# Patient Record
Sex: MALE | Race: WHITE | NOT HISPANIC OR LATINO | Employment: OTHER | URBAN - METROPOLITAN AREA
[De-identification: names, ages, dates, MRNs, and addresses within clinical notes are randomized per-mention and may not be internally consistent; named-entity substitution may affect disease eponyms.]

---

## 2020-05-21 ENCOUNTER — TRANSFERRED RECORDS (OUTPATIENT)
Dept: HEALTH INFORMATION MANAGEMENT | Facility: CLINIC | Age: 55
End: 2020-05-21

## 2020-06-20 ENCOUNTER — TRANSFERRED RECORDS (OUTPATIENT)
Dept: HEALTH INFORMATION MANAGEMENT | Facility: CLINIC | Age: 55
End: 2020-06-20

## 2020-07-07 ENCOUNTER — TRANSFERRED RECORDS (OUTPATIENT)
Dept: HEALTH INFORMATION MANAGEMENT | Facility: CLINIC | Age: 55
End: 2020-07-07

## 2020-08-06 ENCOUNTER — MEDICAL CORRESPONDENCE (OUTPATIENT)
Dept: HEALTH INFORMATION MANAGEMENT | Facility: CLINIC | Age: 55
End: 2020-08-06

## 2020-08-06 ENCOUNTER — TRANSFERRED RECORDS (OUTPATIENT)
Dept: HEALTH INFORMATION MANAGEMENT | Facility: CLINIC | Age: 55
End: 2020-08-06

## 2020-08-31 ENCOUNTER — DOCUMENTATION ONLY (OUTPATIENT)
Dept: TRANSPLANT | Facility: CLINIC | Age: 55
End: 2020-08-31

## 2020-08-31 ENCOUNTER — REFERRAL (OUTPATIENT)
Dept: TRANSPLANT | Facility: CLINIC | Age: 55
End: 2020-08-31

## 2020-08-31 VITALS — HEIGHT: 71 IN | BODY MASS INDEX: 29.12 KG/M2 | WEIGHT: 208 LBS

## 2020-08-31 DIAGNOSIS — I73.9 PVD (PERIPHERAL VASCULAR DISEASE) (H): ICD-10-CM

## 2020-08-31 DIAGNOSIS — I10 ESSENTIAL HYPERTENSION: ICD-10-CM

## 2020-08-31 DIAGNOSIS — N18.6 END STAGE RENAL DISEASE (H): ICD-10-CM

## 2020-08-31 DIAGNOSIS — Z01.818 ENCOUNTER FOR PRE-TRANSPLANT EVALUATION FOR KIDNEY AND PANCREAS TRANSPLANT: ICD-10-CM

## 2020-08-31 DIAGNOSIS — E78.5 HYPERLIPIDEMIA: ICD-10-CM

## 2020-08-31 DIAGNOSIS — E11.9 DIABETES (H): ICD-10-CM

## 2020-08-31 DIAGNOSIS — L08.9 FOOT INFECTION: ICD-10-CM

## 2020-08-31 DIAGNOSIS — G47.33 OBSTRUCTIVE SLEEP APNEA: ICD-10-CM

## 2020-08-31 DIAGNOSIS — J44.9 COPD (CHRONIC OBSTRUCTIVE PULMONARY DISEASE) (H): ICD-10-CM

## 2020-08-31 DIAGNOSIS — N18.6 ESRD (END STAGE RENAL DISEASE) ON DIALYSIS (H): ICD-10-CM

## 2020-08-31 DIAGNOSIS — Z87.891 HISTORY OF TOBACCO USE: ICD-10-CM

## 2020-08-31 DIAGNOSIS — I10 HYPERTENSION: ICD-10-CM

## 2020-08-31 DIAGNOSIS — N18.6 ESRD (END STAGE RENAL DISEASE) (H): Primary | ICD-10-CM

## 2020-08-31 DIAGNOSIS — E11.9 DIABETES MELLITUS, TYPE 2 (H): ICD-10-CM

## 2020-08-31 DIAGNOSIS — Z76.82 ORGAN TRANSPLANT CANDIDATE: ICD-10-CM

## 2020-08-31 DIAGNOSIS — Z99.2 ESRD (END STAGE RENAL DISEASE) ON DIALYSIS (H): ICD-10-CM

## 2020-08-31 SDOH — HEALTH STABILITY: MENTAL HEALTH: HOW OFTEN DO YOU HAVE A DRINK CONTAINING ALCOHOL?: MONTHLY OR LESS

## 2020-08-31 ASSESSMENT — MIFFLIN-ST. JEOR: SCORE: 1805.61

## 2020-08-31 NOTE — TELEPHONE ENCOUNTER
PCP: unknown-patient can't remember   Referring Provider: Dr. Tony Alberto  Referring Diagnosis: ESRD secondary to DM Type 2    Is patient under the age of 65? Y  Is patient diabetic? Y  Is patient on insulin? Y  Was patient offered a pancreas transplant referral? Y    Is patient in a group home/assisted living? [facility] due to R lower leg amputation 2020  Does patient have a guardian? No    Due to COVID, verbal consent received for Care Everywhere Authorization from the patient during this call.  Patient is a poor historian. He stated he moved from [place] this year where he lived for 12 years. He could not name any of his current providers stating he was terrible with names. He informed this writer his previous clinic in [place] was [facility] and also seen in [facility]. Providers names he could not remember.    Referral intake process completed.  Patient is aware that after financial approval is received, medical records will be requested.   Patient confirmed for a callback from transplant coordinator on Sept. 11, 2020. (within 2 weeks)  Tentative evaluation date Sept. 30, 2020. (within 4 weeks)    Confirmed coordinator will discuss evaluation process in more detail at the time of their call.   Patient is aware of the need to arrange age appropriate cancer screening, vaccinations, and dental care.  Reminded patient to complete questionnaire, complete medical records release, and review packet prior to evaluation visit .  Assessed patient for special needs (ie--wheelchair, assistance, guardian, and ):  walker, wheelchair- prn   Patient instructed to call [phone] with questions.

## 2020-08-31 NOTE — LETTER
September 8, 2020      Vincent Leblanc  [address] [address]          Dear Vincent,    Thank you for your interest in the Transplant Center at [facility], [facility] [place] [facility]. We look forward to being a part of your care team and assisting you through the transplant process.    As we discussed, your transplant coordinator is Beth Wan (Pancreas, Kidney).  You may call your coordinator at any time with questions or concerns.  Your first scheduled call will be on September 11, 2020.  If this needs to change, call [phone].    Please complete the following.    1. Fill out and return the enclosed forms    Authorization for Electronic Communication    Authorization to Discuss Protected Health Information    Authorization for Release of Protected Health Information    2. Sign up for:    [employer], access to your electronic medical record (see enclosed pamphlet)    [employer], a transplant education website    You can use these tools to learn more about your transplant, communicate with your care team, and track your medical details      Sincerely,      Solid Organ Transplant  [facility]    cc: Care Team

## 2020-08-31 NOTE — Clinical Note
See smart set. Needs to be seen in person by  surgeon. STD needs to be changed. He has email so information can be sent electronically. Dialysis is T/TH/SA.
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
## 2020-09-15 NOTE — TELEPHONE ENCOUNTER
Called patient to schedule PKE clinic.  Reviewed equipment needed for virtual visits. Patient confirmed his sister has a laptop with camera and would like to schedule for virtual evaluation.  Reviewed schedule of appts for virtual evaluation. Patient is active in [employer]. Informed patient we would send information about appointments via [employer] and Transplant coordinator will call a few days prior to clinic to review education.
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
## 2020-10-06 ENCOUNTER — TELEPHONE (OUTPATIENT)
Dept: TRANSPLANT | Facility: CLINIC | Age: 55
End: 2020-10-06

## 2020-10-06 NOTE — TELEPHONE ENCOUNTER
Called New and got his voice mail. I reminded him his PKE was tomorrow. I asked him to check his My Chart for information on registering for MTP and watch the videos before tomorrow. I told him ideally I would like to review the videos before his evaluation so he has a solid foundation. Also, talked about the need for the docusign that will be needed before any listing. Gave him my new [employer] phone number.

## 2020-10-07 ENCOUNTER — DOCUMENTATION ONLY (OUTPATIENT)
Dept: TRANSPLANT | Facility: CLINIC | Age: 55
End: 2020-10-07

## 2020-10-07 ENCOUNTER — APPOINTMENT (OUTPATIENT)
Dept: TRANSPLANT | Facility: CLINIC | Age: 55
End: 2020-10-07
Attending: INTERNAL MEDICINE
Payer: COMMERCIAL

## 2020-10-07 DIAGNOSIS — Z53.9 ERRONEOUS ENCOUNTER--DISREGARD: Primary | ICD-10-CM

## 2020-10-07 DIAGNOSIS — Z76.82 ORGAN TRANSPLANT CANDIDATE: Primary | ICD-10-CM

## 2020-10-07 DIAGNOSIS — I10 ESSENTIAL HYPERTENSION: ICD-10-CM

## 2020-10-07 DIAGNOSIS — G47.33 OBSTRUCTIVE SLEEP APNEA: ICD-10-CM

## 2020-10-07 DIAGNOSIS — Z87.891 HISTORY OF TOBACCO USE: ICD-10-CM

## 2020-10-07 DIAGNOSIS — E11.9 DIABETES MELLITUS, TYPE 2 (H): ICD-10-CM

## 2020-10-07 DIAGNOSIS — E78.5 HYPERLIPIDEMIA: ICD-10-CM

## 2020-10-07 DIAGNOSIS — Z01.818 ENCOUNTER FOR PRE-TRANSPLANT EVALUATION FOR KIDNEY AND PANCREAS TRANSPLANT: ICD-10-CM

## 2020-10-07 DIAGNOSIS — L08.9 FOOT INFECTION: ICD-10-CM

## 2020-10-07 DIAGNOSIS — N18.6 ESRD (END STAGE RENAL DISEASE) (H): ICD-10-CM

## 2020-10-07 DIAGNOSIS — N18.6 END STAGE RENAL DISEASE (H): ICD-10-CM

## 2020-10-07 DIAGNOSIS — I73.9 PVD (PERIPHERAL VASCULAR DISEASE) (H): ICD-10-CM

## 2020-10-07 DIAGNOSIS — Z76.82 ORGAN TRANSPLANT CANDIDATE: ICD-10-CM

## 2020-10-07 PROCEDURE — 99207 PR NO CHARGE LOS: CPT | Mod: TEL
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
## 2020-10-07 NOTE — PROGRESS NOTES
Called patient today and spoke with him. Pt explained that he did not attend his pre KP evaluation appointments this morning because he had to have eye surgery. Patient apologized and is very interested in rescheduling. Explained I will ask a  to call him and assist with rescheduling his pre KP eval. Pt expressed very good understanding of all and was in good agreement with the plan.     Staff message today to  to reschedule KP eval, cc'annemarie GOLDBERG RN Transplant Coordinator.
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
## 2020-10-07 NOTE — PROGRESS NOTES
"Patient was called and message left. Yanci Catalan on 10/7/2020 at 8:21 AM    Patient was called and message left to call back. Yanci Catalan on 10/7/2020 at 7:56 AM    Patient was called and message left to call back. Yanci Catalan on 10/7/2020 at 7:33 AM    Vincent Leblanc is a 54 year old male who is being evaluated via a billable video visit.      The patient has been notified of following:     \"This video visit will be conducted via a call between you and your physician/provider. We have found that certain health care needs can be provided without the need for an in-person physical exam.  This service lets us provide the care you need with a video conversation.  If a prescription is necessary we can send it directly to your pharmacy.  If lab work is needed we can place an order for that and you can then stop by our lab to have the test done at a later time.    Video visits are billed at different rates depending on your insurance coverage.  Please reach out to your insurance provider with any questions.    If during the course of the call the physician/provider feels a video visit is not appropriate, you will not be charged for this service.\"    Patient has given verbal consent for Video visit? Yes  How would you like to obtain your AVS? MyChart  If you are dropped from the video visit, the video invite should be resent to: Text to cell phone: [phone]  Will anyone else be joining your video visit? No  {If patient encounters technical issues they should call [phone] :669995}      Video-Visit Details    Type of service:  Video Visit    Video Start Time: {video visit start/end time:152948}  Video End Time: {video visit start/end time:152948}    Originating Location (pt. Location): {video visit patient location:027592::\"Home\"}    Distant Location (provider location):  [facility]     Platform used for Video Visit: {Virtual Visit Platforms:660520::\"[place] [employer]\"}    {signature " options:614981}

## 2020-12-09 NOTE — TELEPHONE ENCOUNTER
Called patient to schedule [facility] clinic. Reviewed equipment needed for virtual visits. Patient confirmed his sister has equipment for virtual evaluation and would like to schedule for Wed 1/6/20. Patient aware that is not a pancreas surgeon clinic but would prefer that date and to follow-up re: pancreas transplant in the future. Reviewed that patient will receive a call between 730-8am from [facility] on date of evaluation, and then will have appts with Surgery, Nephrology, SW and Dietician to follow. Informed patient that evaluation clinic will last 3-4hours. Informed patient we would send information about appointments via [employer] week prior to clinic and Transplant coordinator will call to review education.

## 2021-01-04 ENCOUNTER — HEALTH MAINTENANCE LETTER (OUTPATIENT)
Age: 56
End: 2021-01-04

## 2021-01-06 ENCOUNTER — ALLIED HEALTH/NURSE VISIT (OUTPATIENT)
Dept: TRANSPLANT | Facility: CLINIC | Age: 56
End: 2021-01-06
Attending: INTERNAL MEDICINE
Payer: COMMERCIAL

## 2021-01-06 ENCOUNTER — DOCUMENTATION ONLY (OUTPATIENT)
Dept: TRANSPLANT | Facility: CLINIC | Age: 56
End: 2021-01-06

## 2021-01-06 VITALS — BODY MASS INDEX: 27.3 KG/M2 | WEIGHT: 195 LBS | HEIGHT: 71 IN

## 2021-01-06 DIAGNOSIS — N18.6 TYPE 2 DIABETES MELLITUS WITH CHRONIC KIDNEY DISEASE ON CHRONIC DIALYSIS, WITH LONG-TERM CURRENT USE OF INSULIN (H): Primary | ICD-10-CM

## 2021-01-06 DIAGNOSIS — Z01.818 ENCOUNTER FOR PRE-TRANSPLANT EVALUATION FOR KIDNEY AND PANCREAS TRANSPLANT: ICD-10-CM

## 2021-01-06 DIAGNOSIS — Z76.82 ORGAN TRANSPLANT CANDIDATE: Primary | ICD-10-CM

## 2021-01-06 DIAGNOSIS — K52.9 CHRONIC DIARRHEA: ICD-10-CM

## 2021-01-06 DIAGNOSIS — Z76.82 ORGAN TRANSPLANT CANDIDATE: ICD-10-CM

## 2021-01-06 DIAGNOSIS — I73.9 PAD (PERIPHERAL ARTERY DISEASE) (H): ICD-10-CM

## 2021-01-06 DIAGNOSIS — Z79.4 TYPE 2 DIABETES MELLITUS WITH CHRONIC KIDNEY DISEASE ON CHRONIC DIALYSIS, WITH LONG-TERM CURRENT USE OF INSULIN (H): Primary | ICD-10-CM

## 2021-01-06 DIAGNOSIS — Z99.2 TYPE 2 DIABETES MELLITUS WITH CHRONIC KIDNEY DISEASE ON CHRONIC DIALYSIS, WITH LONG-TERM CURRENT USE OF INSULIN (H): Primary | ICD-10-CM

## 2021-01-06 DIAGNOSIS — Z87.891 FORMER TOBACCO USE: ICD-10-CM

## 2021-01-06 DIAGNOSIS — N18.6 ESRD (END STAGE RENAL DISEASE) (H): ICD-10-CM

## 2021-01-06 DIAGNOSIS — E11.22 TYPE 2 DIABETES MELLITUS WITH CHRONIC KIDNEY DISEASE ON CHRONIC DIALYSIS, WITH LONG-TERM CURRENT USE OF INSULIN (H): Primary | ICD-10-CM

## 2021-01-06 PROCEDURE — 99245 OFF/OP CONSLTJ NEW/EST HI 55: CPT | Mod: 95

## 2021-01-06 PROCEDURE — 99204 OFFICE O/P NEW MOD 45 MIN: CPT | Mod: 95 | Performed by: SURGERY

## 2021-01-06 RX ORDER — DORZOLAMIDE HCL 20 MG/ML
SOLUTION/ DROPS OPHTHALMIC
COMMUNITY
Start: 2020-12-14

## 2021-01-06 RX ORDER — BRIMONIDINE TARTRATE, TIMOLOL MALEATE 2; 5 MG/ML; MG/ML
1 SOLUTION/ DROPS OPHTHALMIC
COMMUNITY
Start: 2020-10-30

## 2021-01-06 RX ORDER — TAMSULOSIN HYDROCHLORIDE 0.4 MG/1
0.4 CAPSULE ORAL
COMMUNITY
Start: 2020-05-26

## 2021-01-06 RX ORDER — DORZOLAMIDE HCL 20 MG/ML
1 SOLUTION/ DROPS OPHTHALMIC
COMMUNITY

## 2021-01-06 RX ORDER — CARVEDILOL 25 MG/1
25 TABLET ORAL
COMMUNITY
Start: 2020-05-25

## 2021-01-06 RX ORDER — FUROSEMIDE 80 MG
80 TABLET ORAL
COMMUNITY
Start: 2020-05-26

## 2021-01-06 RX ORDER — HYDRALAZINE HYDROCHLORIDE 25 MG/1
25 TABLET, FILM COATED ORAL
COMMUNITY
Start: 2020-05-25

## 2021-01-06 RX ORDER — ASPIRIN 81 MG/1
TABLET, CHEWABLE ORAL
COMMUNITY
Start: 2020-12-20

## 2021-01-06 RX ORDER — CLOPIDOGREL BISULFATE 75 MG/1
TABLET ORAL
COMMUNITY
Start: 2020-12-20

## 2021-01-06 RX ORDER — AMLODIPINE BESYLATE 5 MG/1
5 TABLET ORAL
COMMUNITY
Start: 2020-05-25

## 2021-01-06 RX ORDER — SEVELAMER HYDROCHLORIDE 800 MG/1
800 TABLET, FILM COATED ORAL
COMMUNITY
Start: 2020-08-06

## 2021-01-06 RX ORDER — VIT B COMP NO.3/FOLIC/C/BIOTIN 1 MG-60 MG
1 TABLET ORAL
COMMUNITY
Start: 2020-05-26

## 2021-01-06 RX ORDER — OFLOXACIN 3 MG/ML
SOLUTION/ DROPS OPHTHALMIC
COMMUNITY
Start: 2020-11-30

## 2021-01-06 RX ORDER — LOPERAMIDE HCL 2 MG
6 CAPSULE ORAL
COMMUNITY
Start: 2020-06-16

## 2021-01-06 RX ORDER — PREDNISOLONE ACETATE 10 MG/ML
1 SUSPENSION/ DROPS OPHTHALMIC
COMMUNITY

## 2021-01-06 RX ORDER — ATORVASTATIN CALCIUM 40 MG/1
TABLET, FILM COATED ORAL
COMMUNITY
Start: 2020-12-20

## 2021-01-06 RX ORDER — LATANOPROST 50 UG/ML
SOLUTION/ DROPS OPHTHALMIC
COMMUNITY
Start: 2020-12-14

## 2021-01-06 RX ORDER — NEOMYCIN/POLYMYXIN B/DEXAMETHA 3.5-10K-.1
OINTMENT (GRAM) OPHTHALMIC (EYE)
COMMUNITY

## 2021-01-06 ASSESSMENT — PAIN SCALES - GENERAL: PAINLEVEL: NO PAIN (0)

## 2021-01-06 ASSESSMENT — MIFFLIN-ST. JEOR: SCORE: 1741.64

## 2021-01-06 NOTE — LETTER
2021         RE: Vincent Leblanc  [facility]  [address] [address]        Dear Colleague,    Thank you for referring your patient, Vincent Leblanc, to the [facility]. Please see a copy of my visit note below.    Video visit ([employer]) Transplant Surgery Consult Note    Medical record number: [id number]  YOB: 1965,   Consult requested by Dr. Alberto for evaluation of kidney and pancreas transplant candidacy.    Assessment and Recommendations: Mr. Leblanc is a good candidate for transplantation and has a good understanding of the risks and benefits of this approach to management of renal failure and diabetes. The following issues should be addressed prior to transplant:     54 yo with type 2 for 15-20 years  Takes about 70 units/d between Lantus and Novolog  A1c 7.2  No hypo unawareness  Dialysis since 2020  BKA in 2020  No blood transfusion  Lap alejandro 8 years ago  Reasonable candidate for KP    Walks 1-3 miles everyday    Risks of the surgical procedure including but not limited to the rare risk of mortality discussed in detail. Patient verbalized good understanding and had several pertinent questions which were answered satisfactorily.             Mr. Leblanc has End stage renal failure due to diabetes mellitus type 2 whose condition is not expected to resolve, is expected to progress, and is expected to continue to develop related comorbid conditions.  Cardiology consult for cardiac risk stratification to be ordered: Yes  CT abdomen and pelvis without contrast to be ordered for assessment of vascular targets: Yes  Transplant listing labs ordered to include HLA, ABOx2, Cr, etc.  Dietician consult ordered: Yes  Social work consult ordered: Yes  Imaging reports reviewed:  yes  Radiology images reviewed:no      The majority of our visit was spent in counselling, discussing the medical and surgical risks of living or  donor  kidney and pancreas transplantation, either in a simultaneous or sequential fashion. I contrasted approximate wait time for SPK vs living vs  donor kidneys from normal (0-85%) or higher (%) kidney donor profile index (KDPI) donors and their associated outcomes. I would not recommend this individual to consider kidneys from high KDPI donors. The reason for this decision is best summarized as: multi-organ transplant candidate.  Access to transplant will be impacted by living donor availability and overall candidacy for SPK, as well as the influence of blood type and degree of sensitization. We discussed advantages of preemptive transplant as well as living donor kidney transplant, and graft and patient survival outcomes associated with these options. Potential surgical complications of kidney and pancreas transplantation include bleeding, clotting, infection, wound complications, anastomotic failure and other issues such as cardiac complications, pneumonia, deep venous thrombosis, pulmonary embolism, post transplant diabetes and death. We discussed the need for protocol biopsy of the kidney and the possible need for a ureteral stent (and subsequent removal). We discussed benefits and risks associated with different approaches to exocrine drainage of pancreatic secretions. We also discussed differences in the average length of stay, recovery process, and posttransplant lab and monitoring protocol. We discussed the risk of graft rejection and recurrent diabetic nephropathy in the setting of poor glycemic control. I emphasized the need for strict immunosuppression adherence and the potential for complications of immunosuppression such as skin cancer or lymphoma, as well as a very low but not zero risk of donor-derived disease transmission risks (infection, cancer). Mr. Leblanc asked good questions and the patient's candidacy will be reviewed at our Multidisciplinary Selection Committee. Thank you for the  opportunity to participate in Mr. Leblanc's care.    Total time: 45 minutes  Counselling time: 40 minutes    .  ---------------------------------------------------------------------------------------------------    HPI: Mr. Leblanc has End stage renal failure due to diabetes mellitus type 2. The patient has had diabetes for 20 years. Management is by Lantus per diabetic educator  Novolog per diabetic educator. The patient usually checks his blood sugar 3 times/day.    Complications of diabetes include:    Retinopathy:  No  Neuropathy: Yes   Gastroparesis:  No    The patient is on dialysis.    Has potential kidney donors:  No.  Interested in participation in paired exchange if a donor is willing: No    The patient has the following pertinent history:       No    Yes  Dialysis:    []      [] via:       Blood Transfusion                  []      []  Number of units:   Most recently:  Pregnancy:    []      [] Number:       Previous Transplant:  []      [] Details:    Cancer    []      [] Comment:   Kidney stones   []      [] Comment:      Recurrent infections  []      []  Type:                  Bladder dysfunction  []      [] Cause:    Claudication   []      [] Distance:    Previous Amputation  []      [] Cause:     Chronic anticoagulation  []      [] Indication:  Protestant  []      []     Past Medical History:   Diagnosis Date     COPD (chronic obstructive pulmonary disease) (H)      Diabetes (H) 2000    Type 2     Diarrhea      ESRD (end stage renal disease) on dialysis (H)      Fecal incontinence      Hyperlipidemia      Hypertension      Past Surgical History:   Procedure Laterality Date     AMPUTATION Right 2020    [facility] leg[facility]     BACK SURGERY  1991    [place]     CHOLECYSTECTOMY  2014    [place]-name?     ENT SURGERY  1971    T and A     VASCULAR SURGERY  2020    dialysis access- upper chest     No family history on file.  Social History     Socioeconomic History     Marital  status:      Spouse name: Not on file     Number of children: Not on file     Years of education: Not on file     Highest education level: Not on file   Occupational History     Not on file   Social Needs     Financial resource strain: Not on file     Food insecurity     Worry: Not on file     Inability: Not on file     Transportation needs     Medical: Not on file     Non-medical: Not on file   Tobacco Use     Smoking status: Former Smoker     Types: Cigarettes     Quit date:      Years since quittin.0     Smokeless tobacco: Never Used   Substance and Sexual Activity     Alcohol use: Yes     Frequency: Monthly or less     Drug use: Never     Sexual activity: Not on file   Lifestyle     Physical activity     Days per week: Not on file     Minutes per session: Not on file     Stress: Not on file   Relationships     Social connections     Talks on phone: Not on file     Gets together: Not on file     Attends Religion service: Not on file     Active member of club or organization: Not on file     Attends meetings of clubs or organizations: Not on file     Relationship status: Not on file     Intimate partner violence     Fear of current or ex partner: Not on file     Emotionally abused: Not on file     Physically abused: Not on file     Forced sexual activity: Not on file   Other Topics Concern     Parent/sibling w/ CABG, MI or angioplasty before 65F 55M? Not Asked   Social History Narrative     Not on file       ROS:   CONSTITUTIONAL:  No fevers or chills  EYES: negative for icterus  ENT:  negative for hearing loss, tinnitus and sore throat  RESPIRATORY:  negative for cough, sputum, dyspnea  CARDIOVASCULAR:  negative for chest pain Fatigue  GASTROINTESTINAL:  negative for nausea, vomiting, diarrhea or constipation  GENITOURINARY:  negative for incontinence, dysuria, bladder emptying problems  HEME:  No easy bruising  INTEGUMENT:  negative for rash and pruritus  NEURO:  Negative for headache, seizure  disorder    Allergies:   Allergies   Allergen Reactions     Food Anaphylaxis     Allergy to peaches     Lactose Unknown     Added from facility Allergy list        Medications:  Prescription Medications as of 2021       Rx Number Disp Refills Start End Last Dispensed Date Next Fill Date Owning Pharmacy    amLODIPine (NORVASC) 5 MG tablet    2020        Sig: Take 5 mg by mouth    Class: Historical    Route: Oral    aspirin (ASA) 81 MG chewable tablet    2020        Class: Historical    atorvastatin (LIPITOR) 40 MG tablet    2020        Class: Historical    brimonidine-timolol (COMBIGAN) 0.2-0.5 % ophthalmic solution    10/30/2020        Si drop    Class: Historical    carvedilol (COREG) 25 MG tablet    2020        Sig: Take 25 mg by mouth    Class: Historical    Route: Oral    cholecalciferol 25 MCG (1000 UT) TABS    2020        Sig: Take 25 mcg by mouth    Class: Historical    Route: Oral    clopidogrel (PLAVIX) 75 MG tablet    2020        Class: Historical    dorzolamide (TRUSOPT) 2 % ophthalmic solution            Si drop    Class: Historical    dorzolamide (TRUSOPT) 2 % ophthalmic solution    2020        Class: Historical    furosemide (LASIX) 80 MG tablet    2020        Sig: Take 80 mg by mouth    Class: Historical    Route: Oral    hydrALAZINE (APRESOLINE) 25 MG tablet    2020        Sig: Take 25 mg by mouth    Class: Historical    Route: Oral    insulin aspart (NOVOLOG PEN) 100 UNIT/ML pen    2020        Sig: Use carb ratio of 1:8 (breakfast), 1:10 (lunch, supper and snacks) and SF 30 - TDD 60 units    Class: Historical    insulin glargine (LANTUS PEN) 100 UNIT/ML pen    2020        Sig: Inject 24 Units Subcutaneous    Class: Historical    Notes to Pharmacy: If Lantus is not covered by insurance, may substitute Basaglar at same dose and frequency.      Route: Subcutaneous    insulin pen needle (32G X 4 MM) 32G X 4 MM miscellaneous     2020        Class: Historical    Route: Does not apply    iron sucrose (VENOFER) 20 MG/ML injection    2020        Si mg by Intravenous Push route    Class: Historical    Route: Intravenous Push    latanoprost (XALATAN) 0.005 % ophthalmic solution    2020        Class: Historical    loperamide (IMODIUM) 2 MG capsule    2020        Sig: Take 6 mg by mouth    Class: Historical    Route: Oral    multivitamin RENAL (RENAVITE RX/NEPHROVITE) 1 MG tablet    2020        Sig: Take 1 tablet by mouth    Class: Historical    Route: Oral    neomycin-polymixin-dexamethasone (MAXITROL) ophthalmic ointment            Sig: Place  into the left eye at bedtime.    Class: Historical    ofloxacin (OCUFLOX) 0.3 % ophthalmic solution    2020        Class: Historical    prednisoLONE acetate (PRED FORTE) 1 % ophthalmic suspension            Si drop    Class: Historical    sevelamer HCl (RENAGEL) 800 MG tablet    2020        Sig: Take 800 mg by mouth    Class: Historical    Route: Oral    tamsulosin (FLOMAX) 0.4 MG capsule    2020        Sig: Take 0.4 mg by mouth    Class: Historical    Route: Oral          Exam:      Appearance: in no apparent distress.   Skin: normal  Head and Neck: Normal, no rashes or jaundice  Respiratory: easy respirations, no audible wheezing.  Cardiovascular: RRR  Abdomen: obese, Surgical scars consistent with history   Rest of physical deferred    Diagnostics:   No results found for this or any previous visit (from the past 672 hour(s)).  No results found for: CPRA      Again, thank you for allowing me to participate in the care of your patient.        Sincerely,        LISA

## 2021-01-06 NOTE — PROGRESS NOTES
"Psychosocial Assessment  Patient Name/ Age: Vincent Leblanc 55 year old   Medical Record #: [id number]  Duration of Interview:     40  min  Process:   Phone Interview                (counseling < 50%)   Present at Appointment: New and his sister Sadia        :PATRICIA Garcia, [facility] Date:  January 6, 2021        Type of transplant: Kidney/Pancreas    Donor type:   New indicated he does not know of any potential donors at this time.   Cadaver   Prior Transplants:    No Status of Transplant:       Current Living Situation    Location:   [facility] FACILITY  [address] [place], [place] [id number]  With Whom: Brother Juan       Family/ Social Support:    New has two adult children: Radha (27) lives in [place], [place] and Darron (24) [place], [place].  He has one grandson.  Sadia and three additional sisters live in [place], [place].   Available, helpful   Committed relationship:  New indicated he still may be , unsure.  Does not have any contact with his wife.   Single   Other supports:   Friends Available, helpful       Activities/ Functional Ability    Current level:  New wears corrective lenses and has right BKA.   Ambulatory with a cane(sometimes), visually impaired and independent with ADL's     Transportation Drives self       Vocational/Employment/Financial     Employment   Disabled   Job Description      Income  New indicated he became eligible for SSI in April 2020.   SSI   Insurance  [employer] [place]    At this time, patient can afford medication costs:  Yes  [place]       Medical Status    Current mode of treatment for ESRD Dialysis   Complications - Diabetes controlled with insulin. Neuropathy and Glucose Unawareness (1x)       Behavioral    Tobacco Use No Chemical Dependency No   New indicated he quit smoking seven years ago.        Psychiatric Impairment No  See under \"notable items\".    Reading ability Good  Education Level: High School Recent Legal History No      Coping Style/Strategies: New indicated " when under stress he will go for a walk.     Ability to Adhere to Complex Medical Regime: Yes     Adherence History:  New indicated NOW he does follow his physician's recommendations.  He indicated understanding of the importance of being compliant with physician's recommendations.        Education  _X_ Medicare  _X_ Rehabilitation  _X_ Donor issues  _X_ Community resources  _X_ Post discharge housing  _X_ Financial resources  _X_ Medical insurance options  _X_ Psych adjustment  _X_ Family adjustment  _X_ Health Care Directive - Yes, Will Provide Psychosocial Risks of Transplant Reviewed and Discussed:  _X_ Increased stress related to emotional,            family, social, employment or financial           situation  _X_ Affect on work and/or disability benefits  _X_ Affect on future life insurance  _X_ Transplant outcome expectations may           not be met  _X_ Mental Health Risks: anxiety,           depression, PTSD, guilt, grief and           chronic fatigue     Notable Items:   New denied any mental health issues during this assessmet.  During his hospitalization in May 2020 he did have suicidal idealization.  He was seen by psychiatry and diagnosis with Adjustment Disorder with Anxious Distress.  No medications were recommended.  New made statements prior to his BKA and just had started dialysis.  At the time New did express hope for the future and his connection with his children and family were indicated as the reason he could not hurt himself.      Final Evaluation/Assessment   Patient seemed to process information well. Appeared well informed, motivated and able to follow post transplant requirements. Behavior was appropriate during interview. Has adequate income and insurance coverage. Adequate social support. No major contraindications noted for transplant.  At this time patient appears to understand the risks and benefits of transplant.      Recommendation  Acceptable - after completing six month  compliance contract to ensure he continues to follow physician's recommendations.   Selection Criteria Met:  Plan for support Yes   Chemical Dependence Yes   Smoking Yes   Mental Health Yes   Adequate Finances Yes    Signature: PATRICIA Garcia, [facility]   Title: Clinical
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
## 2021-01-06 NOTE — PROGRESS NOTES
[facility] Transplant  Outpatient MNT: Kidney Pancreas Transplant Evaluation    Current BMI: 28 (HT 71 in,  lbs/91 kg)- weight with prosthetic 12/14 CE  BMI is within recommendation of <35 for KP transplant     Time Spent: 15 minutes  Visit Type: Initial   Referring Physician: Ajith     History of previous txp: none   Frequency of BG checks: 3x/day, a little higher/in 200's lately (pt attributes to eating more)  Dialysis: yes    Dialysis Info: HD TTS 1130 am 4/2020  Protein supplement: is offered one, but pt doesn't take d/t diarrhea    Nutrition Assessment  Pt's sister in law and brother cook for pt. Following dialysis diet. Has had diarrhea x 14-15 years. No real follow up from doctors per pt. Happens with any foods/all the time. ?oil in stools     R BKA 5/2020. Weights are with prosthetic     Appetite: very good    Vitamins, Supplements, Pertinent Meds: B complex, phos binder (overall consisent--is on table)  Herbal Medicines/Supplements: none    Weight hx: overall stable, slight decrease     Edema: none     Diet Recall  Breakfast HD days- toast or bagel with butter; non HD days- eggs and toast   Lunch Soup (canned LS or dilutes) and sandwich (liverwurst, ham or salami)   Dinner Pizza burger; roast beef; steak + potatoes/pasta    Snacks None    Beverages Juice (cranberry, 16-24 oz/day), coffee   Alcohol None    Dining out 1x/week     Physical Activity  Walking outside, 3-4x/week for 60+ minutes   Stretching      Labs  Per pt, K/Phos wnl     Nutrition Diagnosis  No nutrition diagnosis identified at this time.     Nutrition Intervention  Nutrition education provided:  Discussed sodium intake (low sodium foods and drinks, seasoning food without salt and tips for low sodium diet). Reviewed considering low sodium deli meat. Discussed protein needs for dialysis in addition to wnl K/Phos levels per his report.    Pt reports possible oil noted in stool. No pancreatic fecal elastase on file.  Consider testing for this for EPI.     Reviewed post txp diet guidelines in brief (will review in further detail post txp):  (1) Review of proper food safety measures d/t immunosuppressant therapy post-op and increased risk for food-borne illness    (2) Avoid the following post txp d/t risk for rejection, unknown effects on the organs, and/or potential interactions with immunosuppressants:  - Herbal, Chinese, holistic, chiropractic, natural, alternative medicines and supplements  - Detoxes and cleanses  - Weight loss pills  - Protein powders or other products with extracts or herbs (ie green tea extract)    (3) Med regimen and possible side effects    Patient Understanding: Pt verbalized understanding of education provided.  Expected Engagement: Good  Follow-Up Plans: PRN     Nutrition Goals  No nutrition goals identified at this time     Maeve Swain RD, LD, CCTD    Pt evaluated via billable telephone visit: 15 min

## 2021-01-06 NOTE — PROGRESS NOTES
Video visit ([facility]) Transplant Surgery Consult Note    Medical record number: [id number]  YOB: 1965,   Consult requested by Dr. Alberto for evaluation of kidney and pancreas transplant candidacy.    Assessment and Recommendations: Mr. Leblanc is a good candidate for transplantation and has a good understanding of the risks and benefits of this approach to management of renal failure and diabetes. The following issues should be addressed prior to transplant:     56 yo with type 2 for 15-20 years  Takes about 70 units/d between Lantus and Novolog  A1c 7.2  No hypo unawareness  Dialysis since 2020  BKA in 2020  No blood transfusion  Lap alejandro 8 years ago  Reasonable candidate for KP    Walks 1-3 miles everyday    Risks of the surgical procedure including but not limited to the rare risk of mortality discussed in detail. Patient verbalized good understanding and had several pertinent questions which were answered satisfactorily.             Mr. Leblanc has End stage renal failure due to diabetes mellitus type 2 whose condition is not expected to resolve, is expected to progress, and is expected to continue to develop related comorbid conditions.  Cardiology consult for cardiac risk stratification to be ordered: Yes  CT abdomen and pelvis without contrast to be ordered for assessment of vascular targets: Yes  Transplant listing labs ordered to include HLA, ABOx2, Cr, etc.  Dietician consult ordered: Yes  Social work consult ordered: Yes  Imaging reports reviewed:  yes  Radiology images reviewed:no      The majority of our visit was spent in counselling, discussing the medical and surgical risks of living or  donor kidney and pancreas transplantation, either in a simultaneous or sequential fashion. I contrasted approximate wait time for SPK vs living vs  donor kidneys from normal (0-85%) or higher (%) kidney donor profile index (KDPI) donors and their associated outcomes.  I would not recommend this individual to consider kidneys from high KDPI donors. The reason for this decision is best summarized as: multi-organ transplant candidate.  Access to transplant will be impacted by living donor availability and overall candidacy for SPK, as well as the influence of blood type and degree of sensitization. We discussed advantages of preemptive transplant as well as living donor kidney transplant, and graft and patient survival outcomes associated with these options. Potential surgical complications of kidney and pancreas transplantation include bleeding, clotting, infection, wound complications, anastomotic failure and other issues such as cardiac complications, pneumonia, deep venous thrombosis, pulmonary embolism, post transplant diabetes and death. We discussed the need for protocol biopsy of the kidney and the possible need for a ureteral stent (and subsequent removal). We discussed benefits and risks associated with different approaches to exocrine drainage of pancreatic secretions. We also discussed differences in the average length of stay, recovery process, and posttransplant lab and monitoring protocol. We discussed the risk of graft rejection and recurrent diabetic nephropathy in the setting of poor glycemic control. I emphasized the need for strict immunosuppression adherence and the potential for complications of immunosuppression such as skin cancer or lymphoma, as well as a very low but not zero risk of donor-derived disease transmission risks (infection, cancer). Mr. Leblanc asked good questions and the patient's candidacy will be reviewed at our Multidisciplinary Selection Committee. Thank you for the opportunity to participate in Mr. Leblanc's care.    Total time: 45 minutes  Counselling time: 40 minutes    .  ---------------------------------------------------------------------------------------------------    HPI: Mr. Leblanc has End stage renal failure due to diabetes  mellitus type 2. The patient has had diabetes for 20 years. Management is by Lantus per diabetic educator  Novolog per diabetic educator. The patient usually checks his blood sugar 3 times/day.    Complications of diabetes include:    Retinopathy:  No  Neuropathy: Yes   Gastroparesis:  No    The patient is on dialysis.    Has potential kidney donors:  No.  Interested in participation in paired exchange if a donor is willing: No    The patient has the following pertinent history:       No    Yes  Dialysis:    []      [] via:       Blood Transfusion                  []      []  Number of units:   Most recently:  Pregnancy:    []      [] Number:       Previous Transplant:  []      [] Details:    Cancer    []      [] Comment:   Kidney stones   []      [] Comment:      Recurrent infections  []      []  Type:                  Bladder dysfunction  []      [] Cause:    Claudication   []      [] Distance:    Previous Amputation  []      [] Cause:     Chronic anticoagulation  []      [] Indication:  Sabianist  []      []     Past Medical History:   Diagnosis Date     COPD (chronic obstructive pulmonary disease) (H)      Diabetes (H) 2000    Type 2     Diarrhea      ESRD (end stage renal disease) on dialysis (H)      Fecal incontinence      Hyperlipidemia      Hypertension      Past Surgical History:   Procedure Laterality Date     AMPUTATION Right 2020    lower leg, [facility]     BACK SURGERY  1991    [place]     CHOLECYSTECTOMY  2014    [place]-name?     ENT SURGERY  1971    [facility]     VASCULAR SURGERY  2020    dialysis access- upper chest     No family history on file.  Social History     Socioeconomic History     Marital status:      Spouse name: Not on file     Number of children: Not on file     Years of education: Not on file     Highest education level: Not on file   Occupational History     Not on file   Social Needs     Financial resource strain: Not on file     Food insecurity      Worry: Not on file     Inability: Not on file     Transportation needs     Medical: Not on file     Non-medical: Not on file   Tobacco Use     Smoking status: Former Smoker     Types: Cigarettes     Quit date:      Years since quittin.0     Smokeless tobacco: Never Used   Substance and Sexual Activity     Alcohol use: Yes     Frequency: Monthly or less     Drug use: Never     Sexual activity: Not on file   Lifestyle     Physical activity     Days per week: Not on file     Minutes per session: Not on file     Stress: Not on file   Relationships     Social connections     Talks on phone: Not on file     Gets together: Not on file     Attends Jainism service: Not on file     Active member of club or organization: Not on file     Attends meetings of clubs or organizations: Not on file     Relationship status: Not on file     Intimate partner violence     Fear of current or ex partner: Not on file     Emotionally abused: Not on file     Physically abused: Not on file     Forced sexual activity: Not on file   Other Topics Concern     Parent/sibling w/ CABG, MI or angioplasty before 65F 55M? Not Asked   Social History Narrative     Not on file       ROS:   CONSTITUTIONAL:  No fevers or chills  EYES: negative for icterus  ENT:  negative for hearing loss, tinnitus and sore throat  RESPIRATORY:  negative for cough, sputum, dyspnea  CARDIOVASCULAR:  negative for chest pain Fatigue  GASTROINTESTINAL:  negative for nausea, vomiting, diarrhea or constipation  GENITOURINARY:  negative for incontinence, dysuria, bladder emptying problems  HEME:  No easy bruising  INTEGUMENT:  negative for rash and pruritus  NEURO:  Negative for headache, seizure disorder    Allergies:   Allergies   Allergen Reactions     Food Anaphylaxis     Allergy to peaches     Lactose Unknown     Added from facility Allergy list        Medications:  Prescription Medications as of 2021       Rx Number Disp Refills Start End Last Dispensed Date Next  Fill Date Owning Pharmacy    amLODIPine (NORVASC) 5 MG tablet    2020        Sig: Take 5 mg by mouth    Class: Historical    Route: Oral    aspirin (ASA) 81 MG chewable tablet    2020        Class: Historical    atorvastatin (LIPITOR) 40 MG tablet    2020        Class: Historical    brimonidine-timolol (COMBIGAN) 0.2-0.5 % ophthalmic solution    10/30/2020        Si drop    Class: Historical    carvedilol (COREG) 25 MG tablet    2020        Sig: Take 25 mg by mouth    Class: Historical    Route: Oral    cholecalciferol 25 MCG (1000 UT) TABS    2020        Sig: Take 25 mcg by mouth    Class: Historical    Route: Oral    clopidogrel (PLAVIX) 75 MG tablet    2020        Class: Historical    dorzolamide (TRUSOPT) 2 % ophthalmic solution            Si drop    Class: Historical    dorzolamide (TRUSOPT) 2 % ophthalmic solution    2020        Class: Historical    furosemide (LASIX) 80 MG tablet    2020        Sig: Take 80 mg by mouth    Class: Historical    Route: Oral    hydrALAZINE (APRESOLINE) 25 MG tablet    2020        Sig: Take 25 mg by mouth    Class: Historical    Route: Oral    insulin aspart (NOVOLOG PEN) 100 UNIT/ML pen    2020        Sig: Use carb ratio of 1:8 (breakfast), 1:10 (lunch, supper and snacks) and SF 30 - TDD 60 units    Class: Historical    insulin glargine (LANTUS PEN) 100 UNIT/ML pen    2020        Sig: Inject 24 Units Subcutaneous    Class: Historical    Notes to Pharmacy: If Lantus is not covered by insurance, may substitute Basaglar at same dose and frequency.      Route: Subcutaneous    insulin pen needle (32G X 4 MM) 32G X 4 MM miscellaneous    2020        Class: Historical    Route: Does not apply    iron sucrose (VENOFER) 20 MG/ML injection    2020        Si mg by Intravenous Push route    Class: Historical    Route: Intravenous Push    latanoprost (XALATAN) 0.005 % ophthalmic solution    2020         Class: Historical    loperamide (IMODIUM) 2 MG capsule    2020        Sig: Take 6 mg by mouth    Class: Historical    Route: Oral    multivitamin RENAL (RENAVITE RX/NEPHROVITE) 1 MG tablet    2020        Sig: Take 1 tablet by mouth    Class: Historical    Route: Oral    neomycin-polymixin-dexamethasone (MAXITROL) ophthalmic ointment            Sig: Place  into the left eye at bedtime.    Class: Historical    ofloxacin (OCUFLOX) 0.3 % ophthalmic solution    2020        Class: Historical    prednisoLONE acetate (PRED FORTE) 1 % ophthalmic suspension            Si drop    Class: Historical    sevelamer HCl (RENAGEL) 800 MG tablet    2020        Sig: Take 800 mg by mouth    Class: Historical    Route: Oral    tamsulosin (FLOMAX) 0.4 MG capsule    2020        Sig: Take 0.4 mg by mouth    Class: Historical    Route: Oral          Exam:      Appearance: in no apparent distress.   Skin: normal  Head and Neck: Normal, no rashes or jaundice  Respiratory: easy respirations, no audible wheezing.  Cardiovascular: RRR  Abdomen: obese, Surgical scars consistent with history   Rest of physical deferred    Diagnostics:   No results found for this or any previous visit (from the past 672 hour(s)).  No results found for: CPRA

## 2021-01-06 NOTE — PROGRESS NOTES
Kidney, Pancreas Transplant Referral - 8/11/2020  Vincent Leblanc attended the pre-transplant evaluation VIRTUALLY 1/6/2021.      Vincent Leblanc was provided Beth Wan's business card and instructed to call with additional questions.      Summary    Team s concerns/comments: Type II DM on dialysis May 2020;  candidate     Candidacy category: Yellow     Action/Plan:  1) Need Cardiac risk assessment  2) s/p right tibial angioplasty and right BKA May 2020. Will need non-contrast abd/pelv CT and aortoiliac US.   3) previously smoked 1/2 PPD x 15 years. Will need PFTs and possible non contrast chest CT   4) Chronic Diarrhea  local GI  plans EGD and colonoscopy.  Patient to follow up with local GI and complete work up.   5) Depression with BKA and dialysis ; social work to review at committee  6)  Dr. Canseco Reasonable candidate for     Expected Selection Meeting Discussion:  Yellow discuss at  1/13/2021

## 2021-01-13 ENCOUNTER — COMMITTEE REVIEW (OUTPATIENT)
Dept: TRANSPLANT | Facility: CLINIC | Age: 56
End: 2021-01-13

## 2021-01-13 DIAGNOSIS — Z76.82 AWAITING ORGAN TRANSPLANT: Primary | ICD-10-CM

## 2021-01-13 NOTE — Clinical Note
Needs in person visit with  surgeon, cardiology, CT a/p, iliacs. Cxr, Ekg, echo and labs. He was hoping to have done at [facility] but he needs in person with surgeon.

## 2021-01-13 NOTE — COMMITTEE REVIEW
Abdominal Committee Review Note     Evaluation Date: 1/6/2021  Committee Review Date: 1/13/2021    Organ being evaluated for: Kidney/Pancreas    Transplant Phase: Evaluation  Transplant Status: Active    Transplant Coordinator: Beth Wan  Transplant Surgeon:   Marin Canseco    Referring Physician: Tony Alberto    Primary Diagnosis: Diabetes Mellitus - Type II (Pancreas)  Secondary Diagnosis:     Committee Review Members:  Nephrology Uri Wright, APRN CNP   Nutrition Maeve Swain, MERY   Pharmacist Clinician- Clinical Pharmacy Specialist Cesar Escobar, [facility]    - Clinical Velia Rosa, [facility], Beata Moser, [facility]   Transplant Felicita Forrester PA-C, Soledad Johnson, ELISA, Juan Winters MD, Vanessa Mcnair RN, Ulices Olguin MD, Beth Wan, RN       Transplant Eligibility: Insulin-dependent diabetes mellitus, Irreversible chronic kidney disease treated w/dialysis or expected need for dialysis    Committee Review Decision: Approved    Relative Contraindications: None    Absolute Contraindications: None    Committee Chair Juan Winters MD verbally attested to the committee's decision.    Committee Discussion Details: Reviewed medical records and evaluation results to date. Patient is approved for spk and kidney alone. He is on dialysis so will not be listed until his evaluation is complete. He will need the following items: cardiology, abdominal/pelvic CT, iliacs, PFTS, CXR,EKG,ECHO,labs, in person visit with pancreas surgeon. He will need to follow up with his local GI for his diarrhea issues with the colonoscopy/EGD. He is also due for dental. Patient will be called and transplant summary letter will be sent.

## 2021-01-14 ENCOUNTER — TELEPHONE (OUTPATIENT)
Dept: TRANSPLANT | Facility: CLINIC | Age: 56
End: 2021-01-14

## 2021-01-14 NOTE — TELEPHONE ENCOUNTER
Called New to discuss the outcome of the Selection Committee from 1/13/2021. I got his voice mail. I left a message that he is approved for transplant but we need to finish up his evaluation first. Asked him to return my call.

## 2021-01-15 ENCOUNTER — TELEPHONE (OUTPATIENT)
Dept: TRANSPLANT | Facility: CLINIC | Age: 56
End: 2021-01-15

## 2021-01-15 NOTE — LETTER
January 15, 2021    Vincent Leblanc  [facility]  [address] [place] [id number]      Dear New,    It was a pleasure to start working with you toward the goal of a kidney/pancreas transplant. Your pre-transplant evaluation began as a virtual visit on January 6, 2021 due to the pandemic. Your evaluation results were discussed at our Multidisciplinary Selection Committee on January 13, 2021. You have been approved for simultaneous kidney/pancreas and kidney alone. You will need to complete your evaluation before being able to be listed. We are asking for the following items:    1. We are asking you to see cardiology to make sure your heart is healthy enough for a transplant. Our  will call you with this appointment.  2. We are asking you to have an abdominal/pelvic CT scan. This helps the surgeon decide where to place new organs. Our  will call you with this appointment.  3. We are asking you to have iliac ultrasounds done. This looks at the blood flow to your legs and helps the surgeon decide which side is the best for a new organ. Our  will call you with this appointment.  4. We are asking you to have PFT's. Pulmonary function tests look at your breathing and lung capacity. This is important when people have a history of smoking. Our  will call you with this appointment.  5. We will need to complete your evaluation items with a chest xray, ekg, echocardiogram and labs. Our  will call you with this appointment.  6. You will need to be seen in person by a pancreas surgeon to make sure your abdomen is suitable for a pancreas transplant. Our  will call you with this appointment.  7. Please make an appointment with your dentist and have any recommended work done. Call me when complete.  8. Please follow up with your local GI provider and have your colonoscopy and endoscopy done. Call me when complete so I can obtain the records.   9. Please call me  with your correct address.    Once the above has been successfully completed we will place you on the ACTIVE list meaning you will be eligible to receive organ offers. Your wait time will start with the beginning date of your dialysis so you will not be disadvantaged. You will be notified by our office when you are placed on ACTIVE status.    Please have any potential live donors register now online with our program to initiate their evaluations at [email or web address] or call [phone]. You will be notified by our office in the event of an approved live donor.     Please feel free to call me, email or My Chart message me.        Sincerely,       Cyndi Wan, RN, BSN Transplant Coordinator  [facility] [address]  [address] [facility], [place] [address]  Phone [phone]  Fax [phone]  [email or web address]    CC:Julianne De Leon, Tony Alberto, [facility] Karen

## 2021-01-15 NOTE — TELEPHONE ENCOUNTER
Tried to reach New again today to discuss the outcome of the Selection Committee from 1/13/2021. I got his voice mail and asked him to return my call.

## 2021-01-18 ENCOUNTER — TELEPHONE (OUTPATIENT)
Dept: TRANSPLANT | Facility: CLINIC | Age: 56
End: 2021-01-18

## 2021-01-18 NOTE — TELEPHONE ENCOUNTER
Tried again to reach New to talk about the Selection Committee outcome from 1/13/2021 and was only able to leave a voice message. I also left a message on his emergency contact voice mail.

## 2021-01-25 ENCOUNTER — TELEPHONE (OUTPATIENT)
Dept: TRANSPLANT | Facility: CLINIC | Age: 56
End: 2021-01-25

## 2021-01-25 ENCOUNTER — DOCUMENTATION ONLY (OUTPATIENT)
Dept: TRANSPLANT | Facility: CLINIC | Age: 56
End: 2021-01-25

## 2021-01-25 NOTE — TELEPHONE ENCOUNTER
Talked to New this morning about his approval for spk and kidney alone. He got a new phone and the ringer was on silent so he did not realize I was calling him. He is on dialysis so will not be listed until he is ready  for active status. He will need the following items: cardiology,CT a/p,iliacs, pft's cxr, ekg, echo, listing labs, local GI follow up with colonoscopy/EGD, dental and in person visit with a pancreas surgeon. We discussed the KDPI and he does not want to consider the higher offers. Receipt of Information, KDPI and transplant summary letter will be mailed to New. He knows he has to sign and reteurn.

## 2021-02-05 ENCOUNTER — DOCUMENTATION ONLY (OUTPATIENT)
Dept: TRANSPLANT | Facility: CLINIC | Age: 56
End: 2021-02-05

## 2021-02-05 NOTE — PROGRESS NOTES
Pt Vincent Leblanc signed the KDPI (No not willing to accept >85%) and the NAGA.  Forms signed by Ashia Ramos and sent to [employer]. anne marie Wan

## 2021-02-17 ENCOUNTER — TRANSFERRED RECORDS (OUTPATIENT)
Dept: HEALTH INFORMATION MANAGEMENT | Facility: CLINIC | Age: 56
End: 2021-02-17

## 2021-04-14 ENCOUNTER — DOCUMENTATION ONLY (OUTPATIENT)
Dept: TRANSPLANT | Facility: CLINIC | Age: 56
End: 2021-04-14

## 2021-04-14 NOTE — PROGRESS NOTES
"NAGA and KDPI forms signed 2/2/2021.    The KDPI form was not marked for I am willing or I am NOT willing to receive offer > 85%     Dr. Canseco note: \" I would not recommend this individual to consider kidneys from high KDPI donors. The reason for this decision is best summarized as: multi-organ transplant candidate.\"    I called Mr. Leblanc and received his voice mail.     I left a voice message asking him to call Ashia back at [phone] to confirm his decision for the KDPI form.   I informed him in message that  Dr. Canseco recommended NOT to accept.     Cyndi reports Mr. Leblanc request NOT willing to KDPI, form marked as such and sent to scanning.     KDPI definition.   Form was marked and sent to [employer].       KDPI definition: Kidney Donor Profile Index (KDPI) \"Donors get scored 1-100 based on their age, sex, race, cause of death and organ function.  This score is a very general predictor of future kidney performance. On average, kidneys with a higher score may not function as long as kidneys with a lower score.  This score does not necessarily predict how this specific kidney will perform.  We use this score plus other information like the creatinine and urine output to help us match kidneys with a higher score with people like you who are predicted to have a survival benefit by getting transplanted earlier rather than staying on dialysis.  Your surgeon will review KDPI and give recommendations of what is  appropriate for you to consider.\"     Form was marked and sent to [employer].       "

## 2021-05-01 ENCOUNTER — HEALTH MAINTENANCE LETTER (OUTPATIENT)
Age: 56
End: 2021-05-01

## 2021-05-11 ENCOUNTER — MEDICAL CORRESPONDENCE (OUTPATIENT)
Dept: HEALTH INFORMATION MANAGEMENT | Facility: CLINIC | Age: 56
End: 2021-05-11

## 2021-05-11 ENCOUNTER — TRANSFERRED RECORDS (OUTPATIENT)
Dept: HEALTH INFORMATION MANAGEMENT | Facility: CLINIC | Age: 56
End: 2021-05-11

## 2021-08-04 ENCOUNTER — TELEPHONE (OUTPATIENT)
Dept: TRANSPLANT | Facility: CLINIC | Age: 56
End: 2021-08-04

## 2021-08-04 NOTE — LETTER
September 22, 2021        Vincent Leblanc   [address]  [facility] [address]      Dear Mr. Vincent Leblanc:    We have been trying to reach you to schedule 2nd Day Pre-Kidney Transplant Evaluation appointments here at [facility] with [facility].    Please call me back at your earliest convenience; my telephone number is [phone].    Sincerely,        Kidney Transplant Scheduling Team    [facility]  Solid Organ Transplant Office  [facility] [address]  [address] [place] [id number]

## 2021-08-04 NOTE — TELEPHONE ENCOUNTER
Left message for patient to schedule Lab Draw, CT Abdomen/Pelvis, Illiac US, Echocardiogram, EKG, and Cardiology appointments.  Asked patient to call back to schedule.

## 2021-08-15 ENCOUNTER — HEALTH MAINTENANCE LETTER (OUTPATIENT)
Age: 56
End: 2021-08-15

## 2021-08-20 NOTE — TELEPHONE ENCOUNTER
Patient states he will have testing done locally. He  states he will call me back to schedule an in-person appointment with a Transplant Surgeon and a Cardiology appointment.

## 2021-09-20 NOTE — TELEPHONE ENCOUNTER
Left message for patient to schedule 2nd Day PKE appointments.  Asked patient to call back to schedule.

## 2021-09-23 ENCOUNTER — DOCUMENTATION ONLY (OUTPATIENT)
Dept: TRANSPLANT | Facility: CLINIC | Age: 56
End: 2021-09-23

## 2021-09-23 NOTE — PROGRESS NOTES
Scheduling has been unable to reach New for follow up scheduling. I called the dialysis unit and they were not surprised and have had numerous discussions with him about taking the initiative for his health. He has not responded to trying to reach you letters either. I told dialysis staff if New does not call scheduling by 10/1/2021 we will close his evaluation.

## 2021-09-30 NOTE — TELEPHONE ENCOUNTER
Spoke with the patient and confirmed Lab Draw, CXR, EKG, Echocardiogram, Illiac Ultrasound, Abdomen/Pelvis CT, and Transplant Surgeon appointment on 11/1/21 and cardiology appointments on 11/8/21.  Informed patient an itinerary can be accessed on [employer].

## 2021-10-05 DIAGNOSIS — Z76.82 AWAITING ORGAN TRANSPLANT: Primary | ICD-10-CM

## 2021-10-11 ENCOUNTER — HEALTH MAINTENANCE LETTER (OUTPATIENT)
Age: 56
End: 2021-10-11

## 2021-11-01 ENCOUNTER — ANCILLARY PROCEDURE (OUTPATIENT)
Dept: CARDIOLOGY | Facility: CLINIC | Age: 56
End: 2021-11-01
Attending: PHYSICIAN ASSISTANT
Payer: COMMERCIAL

## 2021-11-01 ENCOUNTER — OFFICE VISIT (OUTPATIENT)
Dept: TRANSPLANT | Facility: CLINIC | Age: 56
End: 2021-11-01
Attending: PHYSICIAN ASSISTANT
Payer: COMMERCIAL

## 2021-11-01 ENCOUNTER — ANCILLARY PROCEDURE (OUTPATIENT)
Dept: ULTRASOUND IMAGING | Facility: CLINIC | Age: 56
End: 2021-11-01
Attending: PHYSICIAN ASSISTANT
Payer: COMMERCIAL

## 2021-11-01 ENCOUNTER — ANCILLARY PROCEDURE (OUTPATIENT)
Dept: GENERAL RADIOLOGY | Facility: CLINIC | Age: 56
End: 2021-11-01
Payer: COMMERCIAL

## 2021-11-01 ENCOUNTER — LAB (OUTPATIENT)
Dept: LAB | Facility: CLINIC | Age: 56
End: 2021-11-01
Attending: PHYSICIAN ASSISTANT
Payer: COMMERCIAL

## 2021-11-01 ENCOUNTER — ANCILLARY PROCEDURE (OUTPATIENT)
Dept: CT IMAGING | Facility: CLINIC | Age: 56
End: 2021-11-01
Attending: PHYSICIAN ASSISTANT
Payer: MEDICARE

## 2021-11-01 VITALS
SYSTOLIC BLOOD PRESSURE: 170 MMHG | WEIGHT: 230 LBS | HEART RATE: 68 BPM | DIASTOLIC BLOOD PRESSURE: 87 MMHG | BODY MASS INDEX: 32.08 KG/M2 | OXYGEN SATURATION: 97 %

## 2021-11-01 DIAGNOSIS — E11.22 TYPE 2 DIABETES MELLITUS WITH CHRONIC KIDNEY DISEASE ON CHRONIC DIALYSIS, WITH LONG-TERM CURRENT USE OF INSULIN (H): ICD-10-CM

## 2021-11-01 DIAGNOSIS — N18.6 ESRD (END STAGE RENAL DISEASE) (H): ICD-10-CM

## 2021-11-01 DIAGNOSIS — Z76.82 ORGAN TRANSPLANT CANDIDATE: ICD-10-CM

## 2021-11-01 DIAGNOSIS — Z79.4 TYPE 2 DIABETES MELLITUS WITH CHRONIC KIDNEY DISEASE ON CHRONIC DIALYSIS, WITH LONG-TERM CURRENT USE OF INSULIN (H): ICD-10-CM

## 2021-11-01 DIAGNOSIS — Z01.818 ENCOUNTER FOR PRE-TRANSPLANT EVALUATION FOR KIDNEY AND PANCREAS TRANSPLANT: ICD-10-CM

## 2021-11-01 DIAGNOSIS — Z76.82 AWAITING ORGAN TRANSPLANT: Primary | ICD-10-CM

## 2021-11-01 DIAGNOSIS — Z99.2 TYPE 2 DIABETES MELLITUS WITH CHRONIC KIDNEY DISEASE ON CHRONIC DIALYSIS, WITH LONG-TERM CURRENT USE OF INSULIN (H): ICD-10-CM

## 2021-11-01 DIAGNOSIS — Z76.82 AWAITING ORGAN TRANSPLANT: ICD-10-CM

## 2021-11-01 DIAGNOSIS — N18.6 TYPE 2 DIABETES MELLITUS WITH CHRONIC KIDNEY DISEASE ON CHRONIC DIALYSIS, WITH LONG-TERM CURRENT USE OF INSULIN (H): ICD-10-CM

## 2021-11-01 DIAGNOSIS — I73.9 PAD (PERIPHERAL ARTERY DISEASE) (H): ICD-10-CM

## 2021-11-01 LAB
ABO/RH(D): NORMAL
ALBUMIN SERPL-MCNC: 3.9 G/DL (ref 3.4–5)
ALBUMIN UR-MCNC: >499 MG/DL
ALP SERPL-CCNC: 116 U/L (ref 40–150)
ALT SERPL W P-5'-P-CCNC: 35 U/L (ref 0–70)
ANION GAP SERPL CALCULATED.3IONS-SCNC: 9 MMOL/L (ref 3–14)
ANTIBODY SCREEN: NEGATIVE
APPEARANCE UR: ABNORMAL
APTT PPP: 30 SECONDS (ref 22–38)
AST SERPL W P-5'-P-CCNC: 19 U/L (ref 0–45)
BACTERIA #/AREA URNS HPF: ABNORMAL /HPF
BASOPHILS # BLD AUTO: 0.1 10E3/UL (ref 0–0.2)
BASOPHILS NFR BLD AUTO: 1 %
BILIRUB SERPL-MCNC: 0.6 MG/DL (ref 0.2–1.3)
BILIRUB UR QL STRIP: NEGATIVE
BUN SERPL-MCNC: 43 MG/DL (ref 7–30)
C PEPTIDE SERPL-MCNC: 6 NG/ML (ref 0.9–6.9)
CALCIUM SERPL-MCNC: 8.1 MG/DL (ref 8.5–10.1)
CHLORIDE BLD-SCNC: 102 MMOL/L (ref 94–109)
CMV IGG SERPL IA-ACNC: <0.2 U/ML
CMV IGG SERPL IA-ACNC: NORMAL
CO2 SERPL-SCNC: 24 MMOL/L (ref 20–32)
COLOR UR AUTO: YELLOW
CREAT SERPL-MCNC: 7.94 MG/DL (ref 0.66–1.25)
EBV VCA IGG SER IA-ACNC: 169 U/ML
EBV VCA IGG SER IA-ACNC: POSITIVE
EOSINOPHIL # BLD AUTO: 0.3 10E3/UL (ref 0–0.7)
EOSINOPHIL NFR BLD AUTO: 3 %
ERYTHROCYTE [DISTWIDTH] IN BLOOD BY AUTOMATED COUNT: 14 % (ref 10–15)
FACTOR 2 INTERPRETATION: NORMAL
FACTOR V INTERPRETATION: NORMAL
GFR SERPL CREATININE-BSD FRML MDRD: 7 ML/MIN/1.73M2
GLUCOSE BLD-MCNC: 132 MG/DL (ref 70–99)
GLUCOSE UR STRIP-MCNC: NEGATIVE MG/DL
HBA1C MFR BLD: 5.7 % (ref 0–5.6)
HBV CORE AB SERPL QL IA: NONREACTIVE
HBV SURFACE AB SERPL IA-ACNC: 19.86 M[IU]/ML
HBV SURFACE AG SERPL QL IA: NONREACTIVE
HCT VFR BLD AUTO: 32.9 % (ref 40–53)
HCV AB SERPL QL IA: NONREACTIVE
HGB BLD-MCNC: 11.1 G/DL (ref 13.3–17.7)
HGB UR QL STRIP: ABNORMAL
HIV 1+2 AB+HIV1 P24 AG SERPL QL IA: NONREACTIVE
IMM GRANULOCYTES # BLD: 0 10E3/UL
IMM GRANULOCYTES NFR BLD: 0 %
INR PPP: 1.02 (ref 0.85–1.15)
KETONES UR STRIP-MCNC: NEGATIVE MG/DL
LAB DIRECTOR COMMENTS: NORMAL
LAB DIRECTOR DISCLAIMER: NORMAL
LAB DIRECTOR INTERPRETATION: NORMAL
LAB DIRECTOR METHODOLOGY: NORMAL
LAB DIRECTOR RESULTS: NORMAL
LEUKOCYTE ESTERASE UR QL STRIP: ABNORMAL
LVEF ECHO: NORMAL
LYMPHOCYTES # BLD AUTO: 2.2 10E3/UL (ref 0.8–5.3)
LYMPHOCYTES NFR BLD AUTO: 24 %
MCH RBC QN AUTO: 30.5 PG (ref 26.5–33)
MCHC RBC AUTO-ENTMCNC: 33.7 G/DL (ref 31.5–36.5)
MCV RBC AUTO: 90 FL (ref 78–100)
MONOCYTES # BLD AUTO: 0.7 10E3/UL (ref 0–1.3)
MONOCYTES NFR BLD AUTO: 8 %
NEUTROPHILS # BLD AUTO: 5.9 10E3/UL (ref 1.6–8.3)
NEUTROPHILS NFR BLD AUTO: 64 %
NITRATE UR QL: NEGATIVE
NRBC # BLD AUTO: 0 10E3/UL
NRBC BLD AUTO-RTO: 0 /100
PH UR STRIP: 7 [PH] (ref 5–7)
PLATELET # BLD AUTO: 229 10E3/UL (ref 150–450)
POTASSIUM BLD-SCNC: 4.7 MMOL/L (ref 3.4–5.3)
PROT SERPL-MCNC: 7.3 G/DL (ref 6.8–8.8)
PSA SERPL-MCNC: 3.61 UG/L (ref 0–4)
RBC # BLD AUTO: 3.64 10E6/UL (ref 4.4–5.9)
RBC URINE: 26 /HPF
SODIUM SERPL-SCNC: 135 MMOL/L (ref 133–144)
SP GR UR STRIP: 1.01 (ref 1–1.03)
SPECIMEN DESCRIPTION: NORMAL
SPECIMEN EXPIRATION DATE: NORMAL
T PALLIDUM AB SER QL: NONREACTIVE
UROBILINOGEN UR STRIP-MCNC: NORMAL MG/DL
VZV IGG SER QL IA: 1143 INDEX
VZV IGG SER QL IA: POSITIVE
WBC # BLD AUTO: 9.1 10E3/UL (ref 4–11)
WBC CLUMPS #/AREA URNS HPF: PRESENT /HPF
WBC URINE: >182 /HPF
XXX BLOOD GROUP AB TITR SERPL: NORMAL {TITER}

## 2021-11-01 PROCEDURE — 86780 TREPONEMA PALLIDUM: CPT | Performed by: PHYSICIAN ASSISTANT

## 2021-11-01 PROCEDURE — 86147 CARDIOLIPIN ANTIBODY EA IG: CPT | Performed by: PHYSICIAN ASSISTANT

## 2021-11-01 PROCEDURE — 85670 THROMBIN TIME PLASMA: CPT | Performed by: PHYSICIAN ASSISTANT

## 2021-11-01 PROCEDURE — 99213 OFFICE O/P EST LOW 20 MIN: CPT | Performed by: TRANSPLANT SURGERY

## 2021-11-01 PROCEDURE — 87340 HEPATITIS B SURFACE AG IA: CPT | Performed by: PHYSICIAN ASSISTANT

## 2021-11-01 PROCEDURE — 86704 HEP B CORE ANTIBODY TOTAL: CPT | Performed by: PHYSICIAN ASSISTANT

## 2021-11-01 PROCEDURE — G0103 PSA SCREENING: HCPCS | Performed by: PATHOLOGY

## 2021-11-01 PROCEDURE — 71046 X-RAY EXAM CHEST 2 VIEWS: CPT | Mod: GC | Performed by: RADIOLOGY

## 2021-11-01 PROCEDURE — 86665 EPSTEIN-BARR CAPSID VCA: CPT | Performed by: PHYSICIAN ASSISTANT

## 2021-11-01 PROCEDURE — 36415 COLL VENOUS BLD VENIPUNCTURE: CPT | Performed by: PATHOLOGY

## 2021-11-01 PROCEDURE — 93978 VASCULAR STUDY: CPT | Performed by: RADIOLOGY

## 2021-11-01 PROCEDURE — 86832 HLA CLASS I HIGH DEFIN QUAL: CPT | Performed by: PHYSICIAN ASSISTANT

## 2021-11-01 PROCEDURE — 86481 TB AG RESPONSE T-CELL SUSP: CPT | Performed by: PHYSICIAN ASSISTANT

## 2021-11-01 PROCEDURE — 86644 CMV ANTIBODY: CPT | Performed by: PHYSICIAN ASSISTANT

## 2021-11-01 PROCEDURE — 80053 COMPREHEN METABOLIC PANEL: CPT | Performed by: PATHOLOGY

## 2021-11-01 PROCEDURE — 86706 HEP B SURFACE ANTIBODY: CPT | Performed by: PHYSICIAN ASSISTANT

## 2021-11-01 PROCEDURE — 86900 BLOOD TYPING SEROLOGIC ABO: CPT | Performed by: PHYSICIAN ASSISTANT

## 2021-11-01 PROCEDURE — 74176 CT ABD & PELVIS W/O CONTRAST: CPT | Performed by: RADIOLOGY

## 2021-11-01 PROCEDURE — 81240 F2 GENE: CPT | Mod: 90 | Performed by: PHYSICIAN ASSISTANT

## 2021-11-01 PROCEDURE — 85613 RUSSELL VIPER VENOM DILUTED: CPT | Performed by: PHYSICIAN ASSISTANT

## 2021-11-01 PROCEDURE — 81378 HLA I & II TYPING HR: CPT | Performed by: NURSE PRACTITIONER

## 2021-11-01 PROCEDURE — 85730 THROMBOPLASTIN TIME PARTIAL: CPT | Performed by: PATHOLOGY

## 2021-11-01 PROCEDURE — 86833 HLA CLASS II HIGH DEFIN QUAL: CPT | Performed by: PHYSICIAN ASSISTANT

## 2021-11-01 PROCEDURE — 86803 HEPATITIS C AB TEST: CPT | Performed by: PHYSICIAN ASSISTANT

## 2021-11-01 PROCEDURE — G0452 MOLECULAR PATHOLOGY INTERPR: HCPCS | Mod: 26 | Performed by: PATHOLOGY

## 2021-11-01 PROCEDURE — 85025 COMPLETE CBC W/AUTO DIFF WBC: CPT | Performed by: PATHOLOGY

## 2021-11-01 PROCEDURE — 86886 COOMBS TEST INDIRECT TITER: CPT | Performed by: PHYSICIAN ASSISTANT

## 2021-11-01 PROCEDURE — 86787 VARICELLA-ZOSTER ANTIBODY: CPT | Performed by: PHYSICIAN ASSISTANT

## 2021-11-01 PROCEDURE — 93306 TTE W/DOPPLER COMPLETE: CPT | Performed by: INTERNAL MEDICINE

## 2021-11-01 PROCEDURE — 85390 FIBRINOLYSINS SCREEN I&R: CPT | Mod: 26 | Performed by: PATHOLOGY

## 2021-11-01 PROCEDURE — 85610 PROTHROMBIN TIME: CPT | Performed by: PATHOLOGY

## 2021-11-01 PROCEDURE — 83036 HEMOGLOBIN GLYCOSYLATED A1C: CPT | Performed by: PATHOLOGY

## 2021-11-01 PROCEDURE — 84681 ASSAY OF C-PEPTIDE: CPT | Performed by: PHYSICIAN ASSISTANT

## 2021-11-01 PROCEDURE — 87086 URINE CULTURE/COLONY COUNT: CPT | Performed by: PHYSICIAN ASSISTANT

## 2021-11-01 PROCEDURE — 81241 F5 GENE: CPT | Mod: 90 | Performed by: PHYSICIAN ASSISTANT

## 2021-11-01 PROCEDURE — 81001 URINALYSIS AUTO W/SCOPE: CPT | Performed by: PATHOLOGY
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
## 2021-11-02 ENCOUNTER — DOCUMENTATION ONLY (OUTPATIENT)
Dept: TRANSPLANT | Facility: CLINIC | Age: 56
End: 2021-11-02

## 2021-11-02 LAB
BACTERIA UR CULT: NORMAL
CARDIOLIPIN IGG SER IA-ACNC: 2.2 GPL-U/ML
CARDIOLIPIN IGG SER IA-ACNC: NEGATIVE
CARDIOLIPIN IGM SER IA-ACNC: <2 MPL-U/ML
CARDIOLIPIN IGM SER IA-ACNC: NEGATIVE
DRVVT SCREEN RATIO: 0.92
GAMMA INTERFERON BACKGROUND BLD IA-ACNC: 0.03 IU/ML
LA PPP-IMP: NEGATIVE
LUPUS INTERPRETATION: NORMAL
M TB IFN-G BLD-IMP: NEGATIVE
M TB IFN-G CD4+ BCKGRND COR BLD-ACNC: 9.97 IU/ML
MITOGEN IGNF BCKGRD COR BLD-ACNC: 0.01 IU/ML
MITOGEN IGNF BCKGRD COR BLD-ACNC: 0.01 IU/ML
PTT RATIO: 1.03
QUANTIFERON MITOGEN: 10 IU/ML
QUANTIFERON NIL TUBE: 0.03 IU/ML
QUANTIFERON TB1 TUBE: 0.04 IU/ML
QUANTIFERON TB2 TUBE: 0.04
THROMBIN TIME: 15.5 SECONDS (ref 13–19)

## 2021-11-02 NOTE — PROGRESS NOTES
Reviewed CT a/p from 11/1/2021 with Dr Melton to note the prominent inguinal LN. Will need inguinal LN ultrasound in 6 months to document stability

## 2021-11-04 LAB
A*: NORMAL
A*LOCUS SEROLOGIC EQUIVALENT: 26
A*LOCUS: NORMAL
A*SEROLOGIC EQUIVALENT: 68
ABTEST METHOD: NORMAL
B*: NORMAL
B*LOCUS SEROLOGIC EQUIVALENT: 38
B*LOCUS: NORMAL
B*SEROLOGIC EQUIVALENT: 47
BW-1: NORMAL
C*: NORMAL
C*LOCUS SEROLOGIC EQUIVALENT: 6
C*LOCUS: NORMAL
C*SEROLOGIC EQUIVALENT: 12
DPA1*: NORMAL
DPB1*: NORMAL
DPB1*LOCUS: NORMAL
DQA1*: NORMAL
DQA1*LOCUS: NORMAL
DQB1*: NORMAL
DQB1*LOCUS SEROLOGIC EQUIVALENT: 8
DQB1*LOCUS: NORMAL
DQB1*SEROLOGIC EQUIVALENT: 6
DRB1*: NORMAL
DRB1*LOCUS SEROLOGIC EQUIVALENT: 4
DRB1*LOCUS: NORMAL
DRB1*SEROLOGIC EQUIVALENT: 13
DRB3*LOCUS SEROLOGIC EQUIVALENT: 52
DRB3*LOCUS: NORMAL
DRB4*: NORMAL
DRB4*SEROLOGIC EQUIVALENT: 53
DRSSO TEST METHOD: NORMAL
PROTOCOL CUTOFF: NORMAL
SA 1 CELL: NORMAL
SA 1 TEST METHOD: NORMAL
SA 2 CELL: NORMAL
SA 2 TEST METHOD: NORMAL
SA1 HI RISK ABY: NORMAL
SA1 MOD RISK ABY: NORMAL
SA2 HI RISK ABY: NORMAL
SA2 MOD RISK ABY: NORMAL
UNACCEPTABLE ANTIGENS: NORMAL
UNOS CPRA: 39
ZZZSA 1  COMMENTS: NORMAL
ZZZSA 2 COMMENTS: NORMAL

## 2021-11-05 DIAGNOSIS — Z76.82 ORGAN TRANSPLANT CANDIDATE: ICD-10-CM

## 2021-11-05 DIAGNOSIS — R31.29 MICROSCOPIC HEMATURIA: Primary | ICD-10-CM

## 2021-11-08 ENCOUNTER — DOCUMENTATION ONLY (OUTPATIENT)
Dept: OTHER | Facility: CLINIC | Age: 56
End: 2021-11-08

## 2021-11-08 ENCOUNTER — PRE VISIT (OUTPATIENT)
Dept: UROLOGY | Facility: CLINIC | Age: 56
End: 2021-11-08
Payer: MEDICARE

## 2021-11-08 ENCOUNTER — VIRTUAL VISIT (OUTPATIENT)
Dept: CARDIOLOGY | Facility: CLINIC | Age: 56
End: 2021-11-08
Attending: INTERNAL MEDICINE
Payer: COMMERCIAL

## 2021-11-08 DIAGNOSIS — N18.6 ESRD (END STAGE RENAL DISEASE) ON DIALYSIS (H): ICD-10-CM

## 2021-11-08 DIAGNOSIS — Z01.810 PRE-OPERATIVE CARDIOVASCULAR EXAMINATION: Primary | ICD-10-CM

## 2021-11-08 DIAGNOSIS — Z76.82 KIDNEY TRANSPLANT CANDIDATE: ICD-10-CM

## 2021-11-08 DIAGNOSIS — N18.6 TYPE 2 DIABETES MELLITUS WITH CHRONIC KIDNEY DISEASE ON CHRONIC DIALYSIS, WITH LONG-TERM CURRENT USE OF INSULIN (H): ICD-10-CM

## 2021-11-08 DIAGNOSIS — Z76.82 PANCREAS TRANSPLANT CANDIDATE: ICD-10-CM

## 2021-11-08 DIAGNOSIS — Z99.2 ESRD (END STAGE RENAL DISEASE) ON DIALYSIS (H): ICD-10-CM

## 2021-11-08 DIAGNOSIS — I73.9 PERIPHERAL ARTERIAL DISEASE (H): ICD-10-CM

## 2021-11-08 DIAGNOSIS — Z79.4 TYPE 2 DIABETES MELLITUS WITH CHRONIC KIDNEY DISEASE ON CHRONIC DIALYSIS, WITH LONG-TERM CURRENT USE OF INSULIN (H): ICD-10-CM

## 2021-11-08 DIAGNOSIS — E11.22 TYPE 2 DIABETES MELLITUS WITH CHRONIC KIDNEY DISEASE ON CHRONIC DIALYSIS, WITH LONG-TERM CURRENT USE OF INSULIN (H): ICD-10-CM

## 2021-11-08 DIAGNOSIS — Z99.2 TYPE 2 DIABETES MELLITUS WITH CHRONIC KIDNEY DISEASE ON CHRONIC DIALYSIS, WITH LONG-TERM CURRENT USE OF INSULIN (H): ICD-10-CM

## 2021-11-08 PROCEDURE — 99204 OFFICE O/P NEW MOD 45 MIN: CPT | Mod: 95 | Performed by: INTERNAL MEDICINE

## 2021-11-08 RX ORDER — ASPIRIN 325 MG
325 TABLET ORAL ONCE
Status: CANCELLED | OUTPATIENT
Start: 2021-11-08 | End: 2021-11-08

## 2021-11-08 RX ORDER — POTASSIUM CHLORIDE 1500 MG/1
20 TABLET, EXTENDED RELEASE ORAL
Status: CANCELLED | OUTPATIENT
Start: 2021-11-08

## 2021-11-08 RX ORDER — SODIUM CHLORIDE 9 MG/ML
INJECTION, SOLUTION INTRAVENOUS CONTINUOUS
Status: CANCELLED | OUTPATIENT
Start: 2021-11-08

## 2021-11-08 RX ORDER — LIDOCAINE 40 MG/G
CREAM TOPICAL
Status: CANCELLED | OUTPATIENT
Start: 2021-11-08

## 2021-11-08 RX ORDER — POTASSIUM CHLORIDE 1500 MG/1
40 TABLET, EXTENDED RELEASE ORAL
Status: CANCELLED | OUTPATIENT
Start: 2021-11-08

## 2021-11-08 RX ORDER — ACETAZOLAMIDE 500 MG/1
CAPSULE, EXTENDED RELEASE ORAL
COMMUNITY
Start: 2021-01-15

## 2021-11-08 RX ORDER — ASPIRIN 81 MG/1
243 TABLET, CHEWABLE ORAL ONCE
Status: CANCELLED | OUTPATIENT
Start: 2021-11-08

## 2021-11-08 NOTE — PROGRESS NOTES
"Vincent is a 56 year old who is being evaluated via a billable telephone visit.      What phone number would you like to be contacted at? [phone]    How would you like to obtain your AVS? Mail a copy      Vitals - Patient Reported  Height (Patient Reported): 180.3 cm (5' 11\")  Pain Score: No Pain (0) (No SOB)      Vitals were taken and medications reconciled.    Bret Naranjo, EMT  12:02 PM    "
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
## 2021-11-08 NOTE — PATIENT INSTRUCTIONS
1.  Complete a diagnostic procedure called a Coronary Angiogram.  Detailed information was sent to you via [employer] message.  A nurse will follow up with you with further instructions.     2.  No medication changes today.    3.  Return to clinic as needed.

## 2021-11-08 NOTE — TELEPHONE ENCOUNTER
Reason for visit: Consult (referred from [facility])    Relevant information: Microscopic hematuria    Records/imaging/labs/orders: All records available    Pt called: N/A    At Rooming: Video
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
## 2021-11-10 ENCOUNTER — VIRTUAL VISIT (OUTPATIENT)
Dept: UROLOGY | Facility: CLINIC | Age: 56
End: 2021-11-10
Attending: NURSE PRACTITIONER
Payer: COMMERCIAL

## 2021-11-10 VITALS — HEIGHT: 71 IN | BODY MASS INDEX: 32.2 KG/M2 | WEIGHT: 230 LBS

## 2021-11-10 DIAGNOSIS — R31.29 MICROSCOPIC HEMATURIA: ICD-10-CM

## 2021-11-10 DIAGNOSIS — Z76.82 ORGAN TRANSPLANT CANDIDATE: ICD-10-CM

## 2021-11-10 PROCEDURE — 99203 OFFICE O/P NEW LOW 30 MIN: CPT | Mod: 95 | Performed by: PHYSICIAN ASSISTANT

## 2021-11-10 ASSESSMENT — PAIN SCALES - GENERAL: PAINLEVEL: NO PAIN (0)

## 2021-11-10 ASSESSMENT — MIFFLIN-ST. JEOR: SCORE: 1895.4

## 2021-11-10 NOTE — LETTER
11/10/2021       RE: Vincent Leblanc  [address] [place] [id number]     Dear Colleague,    Thank you for referring your patient, Vincent Leblanc, to the [facility] [place] [facility]. Please see a copy of my visit note below.          Chief Complaint:   Microscopic hematuria          History of Present Illness:   Vincent Leblanc is a 56 year old male with a history of hypertension, hyperlipidemia, PAD s/p right tibial angioplasy, diabetes mellitus type II, former tobacco abuse, BPH, and ESRD who is currently undergoing evaluation for kidney/pancreas transplant, who presents for evaluation of microscopic hematuria.  Patient noted to have microscopic hematuria with RBC 26/HPF on urinalysis from 11/1/2021.  Patient also noted to have bacteriuria and pyuria, but urine culture negative for evidence of infection.  He had one previous UA on record from 5/2020 which also showed hematuria, but this was in the setting of culture positive UTI.      Patient denies any baseline urinary symptoms, and continues to void independently while on dialysis.  He denies any frequency, urgency, dysuria, or gross hematuria.  The only thing he has noticed is his urine is a little more cloudy since he started dialysis but otherwise denies any other symptoms.  He is a former smoker with a 7.5 pack year history.  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           Past Medical History:     Past Medical History:   Diagnosis Date     BPH (benign prostatic hyperplasia)      Chronic diarrhea      Diabetes mellitus, type 2 (H) 2000    Type 2     ESRD (end stage renal disease) on dialysis (H)      Fecal incontinence      Former tobacco use      Hx of right BKA (H)      Hyperlipidemia      Hypertension      PAD (peripheral artery disease) (H)             Past Surgical History:     Past Surgical  History:   Procedure Laterality Date     AMPUTATE LEG BELOW KNEE Right 2020    [facility]     BACK SURGERY  1991    [place]     CHOLECYSTECTOMY  2014    [place]-name?     TONSILLECTOMY & ADENOIDECTOMY  1971     VASCULAR SURGERY  2020    dialysis access- upper chest            Medications     Current Outpatient Medications   Medication     acetaZOLAMIDE (DIAMOX SEQUEL) 500 MG 12 hr capsule     amLODIPine (NORVASC) 5 MG tablet     aspirin (ASA) 81 MG chewable tablet     atorvastatin (LIPITOR) 40 MG tablet     brimonidine-timolol (COMBIGAN) 0.2-0.5 % ophthalmic solution     carvedilol (COREG) 25 MG tablet     cholecalciferol 25 MCG (1000 UT) TABS     clopidogrel (PLAVIX) 75 MG tablet     dorzolamide (TRUSOPT) 2 % ophthalmic solution     dorzolamide (TRUSOPT) 2 % ophthalmic solution     furosemide (LASIX) 80 MG tablet     hydrALAZINE (APRESOLINE) 25 MG tablet     insulin aspart (NOVOLOG PEN) 100 UNIT/ML pen     insulin glargine (LANTUS PEN) 100 UNIT/ML pen     insulin pen needle (32G X 4 MM) 32G X 4 MM miscellaneous     iron sucrose (VENOFER) 20 MG/ML injection     latanoprost (XALATAN) 0.005 % ophthalmic solution     loperamide (IMODIUM) 2 MG capsule     multivitamin RENAL (RENAVITE RX/NEPHROVITE) 1 MG tablet     neomycin-polymixin-dexamethasone (MAXITROL) ophthalmic ointment     ofloxacin (OCUFLOX) 0.3 % ophthalmic solution     prednisoLONE acetate (PRED FORTE) 1 % ophthalmic suspension     sevelamer HCl (RENAGEL) 800 MG tablet     tamsulosin (FLOMAX) 0.4 MG capsule     No current facility-administered medications for this visit.            Family History:     Family History   Problem Relation Age of Onset     Kidney Disease Mother             Social History:     Social History     Socioeconomic History     Marital status:      Spouse name: Not on file     Number of children: Not on file     Years of education: Not on file     Highest education level: Not on file   Occupational History     Not  on file   Tobacco Use     Smoking status: Former Smoker     Packs/day: 0.50     Years: 15.00     Pack years: 7.50     Types: Cigarettes     Quit date:      Years since quittin.8     Smokeless tobacco: Never Used   Substance and Sexual Activity     Alcohol use: Yes     Drug use: Never     Sexual activity: Not on file   Other Topics Concern     Parent/sibling w/ CABG, MI or angioplasty before 65F 55M? Not Asked   Social History Narrative     Not on file     Social Determinants of Health     Financial Resource Strain: Not on file   Food Insecurity: Not on file   Transportation Needs: Not on file   Physical Activity: Not on file   Stress: Not on file   Social Connections: Not on file   Intimate Partner Violence: Not on file   Housing Stability: Not on file            Allergies:   Food and Lactose         Review of Systems:  From intake questionnaire   Negative 14 system review except as noted on HPI, nurse's note.         Physical Exam:   Patient is a 56 year old  male   General Appearance Adult: Alert, no acute distress, oriented  Lungs: no respiratory distress, or pursed lip breathing  Heart: No obvious jugular venous distension present  Skin: no suspicious lesions or rashes  Neuro: Alert, oriented, speech and mentation normal  Further examination is deferred due to the nature of our visit.        Labs and Pathology:    I personally reviewed all applicable laboratory data and went over findings with patient  Significant for:    CBC RESULTS:  Recent Labs   Lab Test 21  0850   WBC 9.1   HGB 11.1*           BMP RESULTS:  Recent Labs   Lab Test 21  0849      POTASSIUM 4.7   CHLORIDE 102   CO2 24   ANIONGAP 9   *   BUN 43*   CR 7.94*   GFRESTIMATED 7*   SULAIMAN 8.1*       UA RESULTS:   Recent Labs   Lab Test 21  0855   SG 1.012   URINEPH 7.0   NITRITE Negative   RBCU 26*   WBCU >182*       PSA RESULTS  Prostate Specific Antigen Screen   Date Value Ref Range Status   2021 3.61  0.00 - 4.00 ug/L Final         Imaging:    I personally reviewed all applicable imaging and went over findings with patient.  Significant for:    CT ABDOMEN AND PELVIS WITHOUT CONTRAST 11/1/2021 10:29 AM     CLINICAL HISTORY: dm, htn, former tobacco use, eskd on hd,  kidney/pancreas tx candidate; PAD (peripheral artery disease) (H);  Type 2 diabetes mellitus with chronic kidney disease on chronic  dialysis, with long-term current use of insulin (H); Type 2 diabetes  mellitus with chronic kidney disease on chronic dialysis, with  long-term current use of insulin (H); Type 2 diabetes mellitus with  chronic kidney disease on ch.      COMPARISONS: None available.     REFERRING PROVIDER: ABY PINZON     TECHNIQUE: Non contrast CT performed through the abdomen and pelvis.  Coronal and sagittal reconstructions produced.     CONTRAST: None     DOSE (DLP): 1413 mGy*cm     FINDINGS: Lack of intravenous contrast limits evaluation of the  abdominal and pelvic viscera. Vascular patency cannot be evaluated  without intravascular contrast.     Lung bases clear.     Possible mitral valve replacement versus calcification, incompletely  included in the study.     Cholecystectomy.     Fatty replacement of the pancreas.     Focal 5 mm left lower pole calcifications, parenchymal versus non  obstructing stone.     Colonic diverticulosis without CT evidence for diverticulitis.     Prominent inguinal lymph nodes are reniform in shape and measure less  than 10 mm in short axis, possibly reactive.     Spine degenerative changes.                                                                      IMPRESSION:  1. Lack of intravenous contrast limits evaluation of the abdominal and  pelvic viscera. Vascular patency cannot be evaluated without  intravascular contrast.     2. Fatty replacement of pancrease.     3. Diverticulosis without evidence for diverticulitis.     4. Cholecystectomy.     5. 5 mm left lower pole calcification or non  obstructing renal stone.     6. Prominent inguinal lymph nodes.     MELVIN STEVENS MD          Assessment and Plan:     Assessment: 56 year old male with ESRD currently undergoing evaluation for kidney and pancreas transplant, who presents for evaluation of microscopic hematuria.  Patient with noted hematuria on UA from 11/1/2021 with UC negative for infection.  CT abdomen pelvis from 11/1/2021 showing a 5 mm left lower pole calcification or non-obstructing renal stone.  Patient will be referred for cystoscopy to complete hematuria work up, and would recommend cystoscopy with either Dr. Barnhart or Dr. Lane to allow for review his known non-obstructing left renal stone as well.      Plan:  - Schedule cystoscopy with Dr. Barnhart or Dr. Lane for evaluation of hematuria, and review known non-obstructing left kidney stone seen on CT abdomen pelvis from 11/1/2021.       DARYL Ambriz  Department of Urology     Vincent is a 56 year old who is being evaluated via a billable video visit.      How would you like to obtain your AVS? MyChart  If the video visit is dropped, the invitation should be resent by: Send to e-mail at: [email or web address]  Will anyone else be joining your video visit? No      Video Start Time: 10:25 AM  Video-Visit Details    Type of service:  Video Visit    Video End Time:10:45 AM    Originating Location (pt. Location): Home    Distant Location (provider location):  [facility] [place]     Platform used for Video Visit: Urjanet

## 2021-11-10 NOTE — NURSING NOTE
"Chief Complaint   Patient presents with     Consult     Microscopic hematuria       Height 1.803 m (5' 11\"), weight 104.3 kg (230 lb). Body mass index is 32.08 kg/m .    Patient Active Problem List   Diagnosis     ESRD (end stage renal disease) on dialysis (H)     Hypertension     Diabetes mellitus, type 2 (H)     Chronic diarrhea     Fecal incontinence     Former tobacco use     PAD (peripheral artery disease) (H)       Allergies   Allergen Reactions     Food Anaphylaxis     Allergy to peaches     Lactose Unknown     Added from facility Allergy list        Current Outpatient Medications   Medication Sig Dispense Refill     acetaZOLAMIDE (DIAMOX SEQUEL) 500 MG 12 hr capsule        amLODIPine (NORVASC) 5 MG tablet Take 5 mg by mouth       aspirin (ASA) 81 MG chewable tablet  (Patient not taking: Reported on 11/8/2021)       atorvastatin (LIPITOR) 40 MG tablet        brimonidine-timolol (COMBIGAN) 0.2-0.5 % ophthalmic solution 1 drop       carvedilol (COREG) 25 MG tablet Take 25 mg by mouth       cholecalciferol 25 MCG (1000 UT) TABS Take 25 mcg by mouth       clopidogrel (PLAVIX) 75 MG tablet  (Patient not taking: Reported on 11/8/2021)       dorzolamide (TRUSOPT) 2 % ophthalmic solution 1 drop       dorzolamide (TRUSOPT) 2 % ophthalmic solution  (Patient not taking: Reported on 11/8/2021)       furosemide (LASIX) 80 MG tablet Take 80 mg by mouth       hydrALAZINE (APRESOLINE) 25 MG tablet Take 25 mg by mouth       insulin aspart (NOVOLOG PEN) 100 UNIT/ML pen Use carb ratio of 1:8 (breakfast), 1:10 (lunch, supper and snacks) and SF 30 - TDD 60 units       insulin glargine (LANTUS PEN) 100 UNIT/ML pen Inject 24 Units Subcutaneous       insulin pen needle (32G X 4 MM) 32G X 4 MM miscellaneous        iron sucrose (VENOFER) 20 MG/ML injection 200 mg by Intravenous Push route       latanoprost (XALATAN) 0.005 % ophthalmic solution        loperamide (IMODIUM) 2 MG capsule Take 6 mg by mouth       multivitamin RENAL " (RENAVITE RX/NEPHROVITE) 1 MG tablet Take 1 tablet by mouth (Patient not taking: Reported on 2021)       neomycin-polymixin-dexamethasone (MAXITROL) ophthalmic ointment Place  into the left eye at bedtime. (Patient not taking: Reported on 2021)       ofloxacin (OCUFLOX) 0.3 % ophthalmic solution        prednisoLONE acetate (PRED FORTE) 1 % ophthalmic suspension 1 drop       sevelamer HCl (RENAGEL) 800 MG tablet Take 800 mg by mouth (Patient not taking: Reported on 2021)       tamsulosin (FLOMAX) 0.4 MG capsule Take 0.4 mg by mouth         Social History     Tobacco Use     Smoking status: Former Smoker     Packs/day: 0.50     Years: 15.00     Pack years: 7.50     Types: Cigarettes     Quit date:      Years since quittin.8     Smokeless tobacco: Never Used   Substance Use Topics     Alcohol use: Yes     Drug use: Never       SOILA Vera  11/10/2021  10:12 AM

## 2021-11-10 NOTE — PATIENT INSTRUCTIONS
UROLOGY CLINIC VISIT PATIENT INSTRUCTIONS    - Schedule cystoscopy with Dr. Barnhart or Dr. Lane for evaluation of hematuria, and review known non-obstructing left kidney stone seen on CT abdomen pelvis from 11/1/2021.     If you have any issues, questions or concerns in the meantime, do not hesitate to contact us at [phone] or via Next Jump.     It was a pleasure meeting with you today.  Thank you for allowing me and my team the privilege of caring for you today.  YOU are the reason we are here, and I truly hope we provided you with the excellent service you deserve.  Please let us know if there is anything else we can do for you so that we can be sure you are leaving completely satisfied with your care experience.    Jenniffer Mujica PA-C  Department of Urology

## 2021-11-10 NOTE — PROGRESS NOTES
Vincent is a 56 year old who is being evaluated via a billable video visit.      How would you like to obtain your AVS? [employer]  If the video visit is dropped, the invitation should be resent by: Send to e-mail at: [email or web address]  Will anyone else be joining your video visit? No      Video Start Time: 10:25 AM  Video-Visit Details    Type of service:  Video Visit    Video End Time:10:45 AM    Originating Location (pt. Location): Home    Distant Location (provider location):  [facility] [place]     Platform used for Video Visit: [employer]ista

## 2021-11-10 NOTE — PROGRESS NOTES
Chief Complaint:   Microscopic hematuria          History of Present Illness:   Vincent Leblanc is a 56 year old male with a history of hypertension, hyperlipidemia, PAD s/p right tibial angioplasy, diabetes mellitus type II, former tobacco abuse, BPH, and ESRD who is currently undergoing evaluation for kidney/pancreas transplant, who presents for evaluation of microscopic hematuria.  Patient noted to have microscopic hematuria with RBC 26/HPF on urinalysis from 11/1/2021.  Patient also noted to have bacteriuria and pyuria, but urine culture negative for evidence of infection.  He had one previous UA on record from 5/2020 which also showed hematuria, but this was in the setting of culture positive UTI.      Patient denies any baseline urinary symptoms, and continues to void independently while on dialysis.  He denies any frequency, urgency, dysuria, or gross hematuria.  The only thing he has noticed is his urine is a little more cloudy since he started dialysis but otherwise denies any other symptoms.  He is a former smoker with a 7.5 pack year history.  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           Past Medical History:     Past Medical History:   Diagnosis Date     BPH (benign prostatic hyperplasia)      Chronic diarrhea      Diabetes mellitus, type 2 (H) 2000    Type 2     ESRD (end stage renal disease) on dialysis (H)      Fecal incontinence      Former tobacco use      Hx of right BKA (H)      Hyperlipidemia      Hypertension      PAD (peripheral artery disease) (H)             Past Surgical History:     Past Surgical History:   Procedure Laterality Date     AMPUTATE LEG BELOW KNEE Right 2020    [facility]     BACK SURGERY  1991    [place]     CHOLECYSTECTOMY  2014    [place]name?     TONSILLECTOMY & ADENOIDECTOMY  1971     VASCULAR SURGERY  2020    dialysis access- upper chest            Medications      Current Outpatient Medications   Medication     acetaZOLAMIDE (DIAMOX SEQUEL) 500 MG 12 hr capsule     amLODIPine (NORVASC) 5 MG tablet     aspirin (ASA) 81 MG chewable tablet     atorvastatin (LIPITOR) 40 MG tablet     brimonidine-timolol (COMBIGAN) 0.2-0.5 % ophthalmic solution     carvedilol (COREG) 25 MG tablet     cholecalciferol 25 MCG (1000 UT) TABS     clopidogrel (PLAVIX) 75 MG tablet     dorzolamide (TRUSOPT) 2 % ophthalmic solution     dorzolamide (TRUSOPT) 2 % ophthalmic solution     furosemide (LASIX) 80 MG tablet     hydrALAZINE (APRESOLINE) 25 MG tablet     insulin aspart (NOVOLOG PEN) 100 UNIT/ML pen     insulin glargine (LANTUS PEN) 100 UNIT/ML pen     insulin pen needle (32G X 4 MM) 32G X 4 MM miscellaneous     iron sucrose (VENOFER) 20 MG/ML injection     latanoprost (XALATAN) 0.005 % ophthalmic solution     loperamide (IMODIUM) 2 MG capsule     multivitamin RENAL (RENAVITE RX/NEPHROVITE) 1 MG tablet     neomycin-polymixin-dexamethasone (MAXITROL) ophthalmic ointment     ofloxacin (OCUFLOX) 0.3 % ophthalmic solution     prednisoLONE acetate (PRED FORTE) 1 % ophthalmic suspension     sevelamer HCl (RENAGEL) 800 MG tablet     tamsulosin (FLOMAX) 0.4 MG capsule     No current facility-administered medications for this visit.            Family History:     Family History   Problem Relation Age of Onset     Kidney Disease Mother             Social History:     Social History     Socioeconomic History     Marital status:      Spouse name: Not on file     Number of children: Not on file     Years of education: Not on file     Highest education level: Not on file   Occupational History     Not on file   Tobacco Use     Smoking status: Former Smoker     Packs/day: 0.50     Years: 15.00     Pack years: 7.50     Types: Cigarettes     Quit date:      Years since quittin.8     Smokeless tobacco: Never Used   Substance and Sexual Activity     Alcohol use: Yes     Drug use: Never     Sexual  activity: Not on file   Other Topics Concern     Parent/sibling w/ CABG, MI or angioplasty before 65F 55M? Not Asked   Social History Narrative     Not on file     Social Determinants of Health     Financial Resource Strain: Not on file   Food Insecurity: Not on file   Transportation Needs: Not on file   Physical Activity: Not on file   Stress: Not on file   Social Connections: Not on file   Intimate Partner Violence: Not on file   Housing Stability: Not on file            Allergies:   Food and Lactose         Review of Systems:  From intake questionnaire   Negative 14 system review except as noted on HPI, nurse's note.         Physical Exam:   Patient is a 56 year old  male   General Appearance Adult: Alert, no acute distress, oriented  Lungs: no respiratory distress, or pursed lip breathing  Heart: No obvious jugular venous distension present  Skin: no suspicious lesions or rashes  Neuro: Alert, oriented, speech and mentation normal  Further examination is deferred due to the nature of our visit.        Labs and Pathology:    I personally reviewed all applicable laboratory data and went over findings with patient  Significant for:    CBC RESULTS:  Recent Labs   Lab Test 11/01/21  0850   WBC 9.1   HGB 11.1*           BMP RESULTS:  Recent Labs   Lab Test 11/01/21  0849      POTASSIUM 4.7   CHLORIDE 102   CO2 24   ANIONGAP 9   *   BUN 43*   CR 7.94*   GFRESTIMATED 7*   SULAIMAN 8.1*       UA RESULTS:   Recent Labs   Lab Test 11/01/21  0855   SG 1.012   URINEPH 7.0   NITRITE Negative   RBCU 26*   WBCU >182*       PSA RESULTS  Prostate Specific Antigen Screen   Date Value Ref Range Status   11/01/2021 3.61 0.00 - 4.00 ug/L Final         Imaging:    I personally reviewed all applicable imaging and went over findings with patient.  Significant for:    CT ABDOMEN AND PELVIS WITHOUT CONTRAST 11/1/2021 10:29 AM     CLINICAL HISTORY: dm, htn, former tobacco use, eskd on hd,  kidney/pancreas tx candidate; PAD  (peripheral artery disease) (H);  Type 2 diabetes mellitus with chronic kidney disease on chronic  dialysis, with long-term current use of insulin (H); Type 2 diabetes  mellitus with chronic kidney disease on chronic dialysis, with  long-term current use of insulin (H); Type 2 diabetes mellitus with  chronic kidney disease on ch.      COMPARISONS: None available.     REFERRING PROVIDER: ABY PINZON     TECHNIQUE: Non contrast CT performed through the abdomen and pelvis.  Coronal and sagittal reconstructions produced.     CONTRAST: None     DOSE (DLP): 1413 mGy*cm     FINDINGS: Lack of intravenous contrast limits evaluation of the  abdominal and pelvic viscera. Vascular patency cannot be evaluated  without intravascular contrast.     Lung bases clear.     Possible mitral valve replacement versus calcification, incompletely  included in the study.     Cholecystectomy.     Fatty replacement of the pancreas.     Focal 5 mm left lower pole calcifications, parenchymal versus non  obstructing stone.     Colonic diverticulosis without CT evidence for diverticulitis.     Prominent inguinal lymph nodes are reniform in shape and measure less  than 10 mm in short axis, possibly reactive.     Spine degenerative changes.                                                                      IMPRESSION:  1. Lack of intravenous contrast limits evaluation of the abdominal and  pelvic viscera. Vascular patency cannot be evaluated without  intravascular contrast.     2. Fatty replacement of pancrease.     3. Diverticulosis without evidence for diverticulitis.     4. Cholecystectomy.     5. 5 mm left lower pole calcification or non obstructing renal stone.     6. Prominent inguinal lymph nodes.     MELVIN STEVENS MD          Assessment and Plan:     Assessment: 56 year old male with ESRD currently undergoing evaluation for kidney and pancreas transplant, who presents for evaluation of microscopic hematuria.  Patient with noted  hematuria on UA from 11/1/2021 with UC negative for infection.  CT abdomen pelvis from 11/1/2021 showing a 5 mm left lower pole calcification or non-obstructing renal stone.  Patient will be referred for cystoscopy to complete hematuria work up, and would recommend cystoscopy with either Dr. Barnhart or Dr. Lane to allow for review his known non-obstructing left renal stone as well.      Plan:  - Schedule cystoscopy with Dr. Barnhart or Dr. Lane for evaluation of hematuria, and review known non-obstructing left kidney stone seen on CT abdomen pelvis from 11/1/2021.       DARYL Ambriz  Department of Urology

## 2021-11-11 ENCOUNTER — TELEPHONE (OUTPATIENT)
Dept: UROLOGY | Facility: CLINIC | Age: 56
End: 2021-11-11
Payer: MEDICARE

## 2021-11-11 DIAGNOSIS — Z87.891 FORMER SMOKER: Primary | ICD-10-CM

## 2021-11-11 DIAGNOSIS — I73.9 PAD (PERIPHERAL ARTERY DISEASE) (H): ICD-10-CM

## 2021-11-11 DIAGNOSIS — E11.9 DIABETES MELLITUS, TYPE 2 (H): ICD-10-CM

## 2021-11-11 DIAGNOSIS — Z01.818 PRE-TRANSPLANT EVALUATION FOR KIDNEY TRANSPLANT: ICD-10-CM

## 2021-11-11 DIAGNOSIS — N18.6 END STAGE RENAL DISEASE (H): ICD-10-CM

## 2021-11-11 NOTE — TELEPHONE ENCOUNTER
LVM for patient to Schedule cystoscopy with Dr. Barnhart or Dr. Lane for evaluation of hematuria.

## 2022-01-30 ENCOUNTER — HEALTH MAINTENANCE LETTER (OUTPATIENT)
Age: 57
End: 2022-01-30

## 2022-03-27 ENCOUNTER — HEALTH MAINTENANCE LETTER (OUTPATIENT)
Age: 57
End: 2022-03-27

## 2022-04-19 ENCOUNTER — DOCUMENTATION ONLY (OUTPATIENT)
Dept: TRANSPLANT | Facility: CLINIC | Age: 57
End: 2022-04-19
Payer: MEDICARE

## 2022-04-19 NOTE — PROGRESS NOTES
Spoke with Maeve the  at [facility] about Vincent and his lack of progress. He is not responsive to calls or letters from our office. She wanted to know what he needs to finish his evaluation. He needs pneumococcal vaccination, dental, EGD/colonscopy, inguinal ultrasound, PFT's, cysto and coronary angiogram. She will discuss with Vincent and the nephrologist.

## 2022-07-17 ENCOUNTER — HEALTH MAINTENANCE LETTER (OUTPATIENT)
Age: 57
End: 2022-07-17

## 2022-08-01 ENCOUNTER — DOCUMENTATION ONLY (OUTPATIENT)
Dept: TRANSPLANT | Facility: CLINIC | Age: 57
End: 2022-08-01

## 2022-08-01 NOTE — PROGRESS NOTES
Returned call to Brandee from social work at dialysis. Vincent has been refusing to taske his medications especially the binders. She will have a discussion with him so he understands the immunosuppression medications are not optional and if he is not going to take them he is not a candidate. He still need his cysto, dental, LN ultrasound and heart cath. She will discuss having some of these done at [facility] with him if he is interested in proceeding.

## 2022-08-09 ENCOUNTER — TELEPHONE (OUTPATIENT)
Dept: UROLOGY | Facility: CLINIC | Age: 57
End: 2022-08-09

## 2022-08-09 DIAGNOSIS — D36.7 BENIGN NEOPLASM OF OTHER SPECIFIED SITES: ICD-10-CM

## 2022-08-09 DIAGNOSIS — E11.9 DIABETES MELLITUS, TYPE 2 (H): ICD-10-CM

## 2022-08-09 DIAGNOSIS — N18.6 END STAGE RENAL DISEASE (H): ICD-10-CM

## 2022-08-09 DIAGNOSIS — I25.10 CARDIOVASCULAR DISEASE: ICD-10-CM

## 2022-08-09 DIAGNOSIS — Z87.891 HISTORY OF TOBACCO USE: ICD-10-CM

## 2022-08-09 DIAGNOSIS — Z76.82 AWAITING ORGAN TRANSPLANT: Primary | ICD-10-CM

## 2022-08-09 DIAGNOSIS — Z76.82 ORGAN TRANSPLANT CANDIDATE: ICD-10-CM

## 2022-08-09 DIAGNOSIS — I10 ESSENTIAL HYPERTENSION: ICD-10-CM

## 2022-08-09 DIAGNOSIS — Z01.818 PRE-TRANSPLANT EVALUATION FOR KIDNEY TRANSPLANT: ICD-10-CM

## 2022-08-09 DIAGNOSIS — E78.5 HYPERLIPIDEMIA: ICD-10-CM

## 2022-08-09 NOTE — TELEPHONE ENCOUNTER
Health Call Center    Phone Message    May a detailed message be left on voicemail: yes     Reason for Call: Patient needs to schedule his cysto but would like to get that done in [place].  Cyndi is wondering if Jenniffer Mujica can put in orders for the cysto and she can send the order to [facility] or to please reach out to Cyndi.    Action Taken: Message routed to:  [facility] Urology    Travel Screening: Not Applicable

## 2022-08-09 NOTE — TELEPHONE ENCOUNTER
Attempted to call Cyndi, transplant coordinator. Left detailed message to call clinic back at [phone].   To inform her that provider no longer works in this clinic. If pt wants cysto elsewhere, he may establish care with local urologist. Providers will not provide cysto orders for outside facilities.     Kylie Jackson, RN MSN

## 2022-08-12 ENCOUNTER — TELEPHONE (OUTPATIENT)
Dept: CARDIOLOGY | Facility: CLINIC | Age: 57
End: 2022-08-12

## 2022-08-12 NOTE — TELEPHONE ENCOUNTER
Transplant coordinator Beth Wan sent message stating that patient needs coronary angiogram and wishes to have performed in [place]. CAlled patient, left VM, will continue calling until reached. Has MyChart but has not read any messages sent to him ever.

## 2022-08-22 ENCOUNTER — TELEPHONE (OUTPATIENT)
Dept: TRANSPLANT | Facility: CLINIC | Age: 57
End: 2022-08-22

## 2022-08-22 NOTE — TELEPHONE ENCOUNTER
"Vincent called to let me know it is \"too big of a [place]\" [place]. I told him we have trouble reaching him especially with not looking at his [employer] messages. He said he never does because he is not computer anaid. He also does not return calls because he does not check his voice mails and only answers calls that he recognizes the number. We reviewed again why he needs to check voice mail and answer his phone or we are never going to make any progress. Will have scheduling work on his appointments.  "

## 2022-08-23 ENCOUNTER — TELEPHONE (OUTPATIENT)
Dept: UROLOGY | Facility: CLINIC | Age: 57
End: 2022-08-23

## 2022-09-06 ENCOUNTER — DOCUMENTATION ONLY (OUTPATIENT)
Dept: OTHER | Facility: CLINIC | Age: 57
End: 2022-09-06

## 2022-09-09 ENCOUNTER — HOSPITAL ENCOUNTER (OUTPATIENT)
Facility: CLINIC | Age: 57
Discharge: HOME | End: 2022-09-09
Attending: INTERNAL MEDICINE | Admitting: INTERNAL MEDICINE
Payer: MEDICARE

## 2022-09-09 ENCOUNTER — APPOINTMENT (OUTPATIENT)
Dept: MEDSURG UNIT | Facility: CLINIC | Age: 57
End: 2022-09-09
Attending: INTERNAL MEDICINE
Payer: MEDICARE

## 2022-09-09 ENCOUNTER — APPOINTMENT (OUTPATIENT)
Dept: LAB | Facility: CLINIC | Age: 57
End: 2022-09-09
Attending: INTERNAL MEDICINE
Payer: MEDICARE

## 2022-09-09 VITALS
WEIGHT: 213 LBS | SYSTOLIC BLOOD PRESSURE: 130 MMHG | DIASTOLIC BLOOD PRESSURE: 89 MMHG | TEMPERATURE: 97.9 F | HEART RATE: 69 BPM | HEIGHT: 71 IN | OXYGEN SATURATION: 97 % | BODY MASS INDEX: 29.82 KG/M2 | RESPIRATION RATE: 16 BRPM

## 2022-09-09 DIAGNOSIS — Z76.82 PANCREAS TRANSPLANT CANDIDATE: ICD-10-CM

## 2022-09-09 DIAGNOSIS — Z01.810 PRE-OPERATIVE CARDIOVASCULAR EXAMINATION: ICD-10-CM

## 2022-09-09 DIAGNOSIS — N18.6 TYPE 2 DIABETES MELLITUS WITH CHRONIC KIDNEY DISEASE ON CHRONIC DIALYSIS, WITH LONG-TERM CURRENT USE OF INSULIN (H): ICD-10-CM

## 2022-09-09 DIAGNOSIS — Z76.82 KIDNEY TRANSPLANT CANDIDATE: ICD-10-CM

## 2022-09-09 DIAGNOSIS — Z99.2 ESRD (END STAGE RENAL DISEASE) ON DIALYSIS (H): ICD-10-CM

## 2022-09-09 DIAGNOSIS — N18.6 ESRD (END STAGE RENAL DISEASE) ON DIALYSIS (H): ICD-10-CM

## 2022-09-09 DIAGNOSIS — I73.9 PERIPHERAL ARTERIAL DISEASE (H): ICD-10-CM

## 2022-09-09 DIAGNOSIS — Z79.4 TYPE 2 DIABETES MELLITUS WITH CHRONIC KIDNEY DISEASE ON CHRONIC DIALYSIS, WITH LONG-TERM CURRENT USE OF INSULIN (H): ICD-10-CM

## 2022-09-09 DIAGNOSIS — E11.22 TYPE 2 DIABETES MELLITUS WITH CHRONIC KIDNEY DISEASE ON CHRONIC DIALYSIS, WITH LONG-TERM CURRENT USE OF INSULIN (H): ICD-10-CM

## 2022-09-09 DIAGNOSIS — Z99.2 TYPE 2 DIABETES MELLITUS WITH CHRONIC KIDNEY DISEASE ON CHRONIC DIALYSIS, WITH LONG-TERM CURRENT USE OF INSULIN (H): ICD-10-CM

## 2022-09-09 PROBLEM — Z98.890 STATUS POST CORONARY ANGIOGRAM: Status: ACTIVE | Noted: 2022-09-09

## 2022-09-09 LAB
ANION GAP SERPL CALCULATED.3IONS-SCNC: 10 MMOL/L (ref 7–15)
ATRIAL RATE - MUSE: 70 BPM
BUN SERPL-MCNC: 21.7 MG/DL (ref 6–20)
CALCIUM SERPL-MCNC: 9.2 MG/DL (ref 8.6–10)
CHLORIDE SERPL-SCNC: 95 MMOL/L (ref 98–107)
CREAT SERPL-MCNC: 6.9 MG/DL (ref 0.67–1.17)
DEPRECATED HCO3 PLAS-SCNC: 31 MMOL/L (ref 22–29)
DIASTOLIC BLOOD PRESSURE - MUSE: NORMAL MMHG
ERYTHROCYTE [DISTWIDTH] IN BLOOD BY AUTOMATED COUNT: 14.1 % (ref 10–15)
GFR SERPL CREATININE-BSD FRML MDRD: 9 ML/MIN/1.73M2
GLUCOSE BLDC GLUCOMTR-MCNC: 113 MG/DL (ref 70–99)
GLUCOSE SERPL-MCNC: 114 MG/DL (ref 70–99)
HCT VFR BLD AUTO: 35.4 % (ref 40–53)
HGB BLD-MCNC: 11.8 G/DL (ref 13.3–17.7)
INTERPRETATION ECG - MUSE: NORMAL
MCH RBC QN AUTO: 31.6 PG (ref 26.5–33)
MCHC RBC AUTO-ENTMCNC: 33.3 G/DL (ref 31.5–36.5)
MCV RBC AUTO: 95 FL (ref 78–100)
P AXIS - MUSE: 40 DEGREES
PLATELET # BLD AUTO: 203 10E3/UL (ref 150–450)
POTASSIUM SERPL-SCNC: 4.2 MMOL/L (ref 3.4–5.3)
PR INTERVAL - MUSE: 210 MS
QRS DURATION - MUSE: 106 MS
QT - MUSE: 442 MS
QTC - MUSE: 477 MS
R AXIS - MUSE: -14 DEGREES
RBC # BLD AUTO: 3.74 10E6/UL (ref 4.4–5.9)
SODIUM SERPL-SCNC: 136 MMOL/L (ref 136–145)
SYSTOLIC BLOOD PRESSURE - MUSE: NORMAL MMHG
T AXIS - MUSE: 58 DEGREES
VENTRICULAR RATE- MUSE: 70 BPM
WBC # BLD AUTO: 9.2 10E3/UL (ref 4–11)

## 2022-09-09 PROCEDURE — 250N000009 HC RX 250: Performed by: INTERNAL MEDICINE

## 2022-09-09 PROCEDURE — 93005 ELECTROCARDIOGRAM TRACING: CPT

## 2022-09-09 PROCEDURE — 93454 CORONARY ARTERY ANGIO S&I: CPT | Mod: 26 | Performed by: INTERNAL MEDICINE

## 2022-09-09 PROCEDURE — 82962 GLUCOSE BLOOD TEST: CPT

## 2022-09-09 PROCEDURE — 250N000013 HC RX MED GY IP 250 OP 250 PS 637: Performed by: INTERNAL MEDICINE

## 2022-09-09 PROCEDURE — 272N000001 HC OR GENERAL SUPPLY STERILE: Performed by: INTERNAL MEDICINE

## 2022-09-09 PROCEDURE — 82310 ASSAY OF CALCIUM: CPT | Performed by: INTERNAL MEDICINE

## 2022-09-09 PROCEDURE — 250N000011 HC RX IP 250 OP 636: Performed by: INTERNAL MEDICINE

## 2022-09-09 PROCEDURE — 36415 COLL VENOUS BLD VENIPUNCTURE: CPT | Performed by: INTERNAL MEDICINE

## 2022-09-09 PROCEDURE — 85027 COMPLETE CBC AUTOMATED: CPT | Performed by: INTERNAL MEDICINE

## 2022-09-09 PROCEDURE — 999N000142 HC STATISTIC PROCEDURE PREP ONLY

## 2022-09-09 PROCEDURE — 99152 MOD SED SAME PHYS/QHP 5/>YRS: CPT | Mod: GC | Performed by: INTERNAL MEDICINE

## 2022-09-09 PROCEDURE — 999N000134 HC STATISTIC PP CARE STAGE 3

## 2022-09-09 PROCEDURE — 99152 MOD SED SAME PHYS/QHP 5/>YRS: CPT | Performed by: INTERNAL MEDICINE

## 2022-09-09 PROCEDURE — C1887 CATHETER, GUIDING: HCPCS | Performed by: INTERNAL MEDICINE

## 2022-09-09 PROCEDURE — C1894 INTRO/SHEATH, NON-LASER: HCPCS | Performed by: INTERNAL MEDICINE

## 2022-09-09 PROCEDURE — 93010 ELECTROCARDIOGRAM REPORT: CPT | Performed by: INTERNAL MEDICINE

## 2022-09-09 PROCEDURE — 999N000054 HC STATISTIC EKG NON-CHARGEABLE

## 2022-09-09 PROCEDURE — 258N000003 HC RX IP 258 OP 636: Performed by: INTERNAL MEDICINE

## 2022-09-09 PROCEDURE — 93454 CORONARY ARTERY ANGIO S&I: CPT | Performed by: INTERNAL MEDICINE

## 2022-09-09 RX ORDER — OXYCODONE HYDROCHLORIDE 10 MG/1
10 TABLET ORAL EVERY 4 HOURS PRN
Status: DISCONTINUED | OUTPATIENT
Start: 2022-09-09 | End: 2022-09-09 | Stop reason: HOSPADM

## 2022-09-09 RX ORDER — NITROGLYCERIN 5 MG/ML
VIAL (ML) INTRAVENOUS
Status: DISCONTINUED | OUTPATIENT
Start: 2022-09-09 | End: 2022-09-09 | Stop reason: HOSPADM

## 2022-09-09 RX ORDER — FENTANYL CITRATE 50 UG/ML
25 INJECTION, SOLUTION INTRAMUSCULAR; INTRAVENOUS
Status: DISCONTINUED | OUTPATIENT
Start: 2022-09-09 | End: 2022-09-09 | Stop reason: HOSPADM

## 2022-09-09 RX ORDER — NALOXONE HYDROCHLORIDE 0.4 MG/ML
0.2 INJECTION, SOLUTION INTRAMUSCULAR; INTRAVENOUS; SUBCUTANEOUS
Status: DISCONTINUED | OUTPATIENT
Start: 2022-09-09 | End: 2022-09-09 | Stop reason: HOSPADM

## 2022-09-09 RX ORDER — IOPAMIDOL 755 MG/ML
INJECTION, SOLUTION INTRAVASCULAR
Status: DISCONTINUED | OUTPATIENT
Start: 2022-09-09 | End: 2022-09-09 | Stop reason: HOSPADM

## 2022-09-09 RX ORDER — POTASSIUM CHLORIDE 750 MG/1
40 TABLET, EXTENDED RELEASE ORAL
Status: DISCONTINUED | OUTPATIENT
Start: 2022-09-09 | End: 2022-09-09 | Stop reason: HOSPADM

## 2022-09-09 RX ORDER — NALOXONE HYDROCHLORIDE 0.4 MG/ML
0.4 INJECTION, SOLUTION INTRAMUSCULAR; INTRAVENOUS; SUBCUTANEOUS
Status: DISCONTINUED | OUTPATIENT
Start: 2022-09-09 | End: 2022-09-09 | Stop reason: HOSPADM

## 2022-09-09 RX ORDER — FENTANYL CITRATE 50 UG/ML
INJECTION, SOLUTION INTRAMUSCULAR; INTRAVENOUS
Status: DISCONTINUED | OUTPATIENT
Start: 2022-09-09 | End: 2022-09-09 | Stop reason: HOSPADM

## 2022-09-09 RX ORDER — HEPARIN SODIUM 1000 [USP'U]/ML
INJECTION, SOLUTION INTRAVENOUS; SUBCUTANEOUS
Status: DISCONTINUED | OUTPATIENT
Start: 2022-09-09 | End: 2022-09-09 | Stop reason: HOSPADM

## 2022-09-09 RX ORDER — ASPIRIN 81 MG/1
243 TABLET, CHEWABLE ORAL ONCE
Status: COMPLETED | OUTPATIENT
Start: 2022-09-09 | End: 2022-09-09

## 2022-09-09 RX ORDER — LIDOCAINE 40 MG/G
CREAM TOPICAL
Status: DISCONTINUED | OUTPATIENT
Start: 2022-09-09 | End: 2022-09-09 | Stop reason: HOSPADM

## 2022-09-09 RX ORDER — SODIUM CHLORIDE 9 MG/ML
INJECTION, SOLUTION INTRAVENOUS CONTINUOUS
Status: DISCONTINUED | OUTPATIENT
Start: 2022-09-09 | End: 2022-09-09 | Stop reason: HOSPADM

## 2022-09-09 RX ORDER — OXYCODONE HYDROCHLORIDE 5 MG/1
5 TABLET ORAL EVERY 4 HOURS PRN
Status: DISCONTINUED | OUTPATIENT
Start: 2022-09-09 | End: 2022-09-09 | Stop reason: HOSPADM

## 2022-09-09 RX ORDER — ATROPINE SULFATE 0.1 MG/ML
0.5 INJECTION INTRAVENOUS
Status: DISCONTINUED | OUTPATIENT
Start: 2022-09-09 | End: 2022-09-09 | Stop reason: HOSPADM

## 2022-09-09 RX ORDER — ASPIRIN 325 MG
325 TABLET ORAL ONCE
Status: COMPLETED | OUTPATIENT
Start: 2022-09-09 | End: 2022-09-09

## 2022-09-09 RX ORDER — NICARDIPINE HYDROCHLORIDE 2.5 MG/ML
INJECTION INTRAVENOUS
Status: DISCONTINUED | OUTPATIENT
Start: 2022-09-09 | End: 2022-09-09 | Stop reason: HOSPADM

## 2022-09-09 RX ORDER — FLUMAZENIL 0.1 MG/ML
0.2 INJECTION, SOLUTION INTRAVENOUS
Status: DISCONTINUED | OUTPATIENT
Start: 2022-09-09 | End: 2022-09-09 | Stop reason: HOSPADM

## 2022-09-09 RX ORDER — POTASSIUM CHLORIDE 750 MG/1
20 TABLET, EXTENDED RELEASE ORAL
Status: DISCONTINUED | OUTPATIENT
Start: 2022-09-09 | End: 2022-09-09 | Stop reason: HOSPADM

## 2022-09-09 RX ADMIN — ASPIRIN 325 MG ORAL TABLET 325 MG: 325 PILL ORAL at 14:02

## 2022-09-09 RX ADMIN — SODIUM CHLORIDE: 9 INJECTION, SOLUTION INTRAVENOUS at 14:09

## 2022-09-09 ASSESSMENT — ACTIVITIES OF DAILY LIVING (ADL)
ADLS_ACUITY_SCORE: 35

## 2022-09-09 NOTE — PROGRESS NOTES
Pt prepped for CORS.PIV placed and NS but no flush due to pt on dialysis.Hasmukh groins shaved and prepped and pulse marked and charted on left leg only due to right BKA.IV contrast discharge instructions reviewed and signed and a copy given to pt.ASA given.Consent signed and questions answered.I talked to pt's sister and she said that she is ok taking him home anytime

## 2022-09-09 NOTE — Clinical Note
dry, intact, no bleeding and no hematoma. 6fr RFA sheath removed, TR band placed with 13 ml of air.

## 2022-09-09 NOTE — PROGRESS NOTES
D/I/A: Pt roomed on 3C in bay [address].  Arrived via litter and accompanied by CCL RN On/Off: Off monitor.  VSSA.  Rhythm upon arrival SR on monitor.  Denies pain or sob.  Reviewed activity restrictions and when to notify RN, ie-changes to breathing or increased chest pressure or chest pain.  CCL access:  Right radial-TR band in place-13 ml of air-start deflation at 1715.  P: Continue to monitor status.  Discharge to home once meeting criteria.

## 2022-09-09 NOTE — DISCHARGE INSTRUCTIONS
Going Home after a Coronary Angiogram  After you go home:  Have an adult stay with you for 24 hours.  Drink plenty of fluids.  For 24 hours:  - Relax and take it easy.  - Do NOT smoke.  - Do NOT make any important or legal decisions.  - Do NOT drive or operate machines at home or at work.  - Do NOT drink alcohol.  Remove the Band-Aid after 24 hours. If there is minor oozing, apply another Band-aid and remove it after 12 hours.  For 2 days, do NOT have sex or do any heavy exercise.  Do NOT take a bath, or use a hot tub or pool for at least 3 days. You may shower.    Care of wrist or arm site  It is normal to have soreness at the puncture site and mild tingling in your hand for up to 3 days.  For 2 days, do not use your hand or arm to support your weight (such as rising from a chair) or bend your wrist (such as lifting a garage door).  For 2 days, do not lift more than 5 pounds or exercise your arm (tennis, golf or bowling).      If you start bleeding from the site in your arm:  Sit down and press firmly on the site with your fingers for 10 minutes. Call your doctor as soon as you can.  If the bleeding stops, sit still and keep your wrist straight for 2 hours.    Medicines  No changes to your current medications at this time.    Call your doctor if:  You have a large or growing hard lump around the site.    The site is red, swollen, hot or tender.  Blood or fluid is draining from the site.  You have chills or a fever greater than 101 F (38 C).  Your leg or arm turns bluish, feels numb or cool.  You have hives, a rash or unusual itching.    Call 911 right away if you have:   Bleeding that does not stop.   Heavy bleeding.    [facility] [place]:  [phone] (7 days a week)

## 2022-09-10 NOTE — PROGRESS NOTES
Patient tolerated recovery stage well. VSS, right wrist site clean/dry/intact, no hematoma, and denies pain. Teaching was done and discharge instructions were given. Patient ambulated and PIV was removed. Patient discharged from the hospital via wheel chair to home with family. Declines PO food/fluids d/t immediate diarrhea, plans to eat at home. No urge to void at this time.

## 2022-09-24 ENCOUNTER — HEALTH MAINTENANCE LETTER (OUTPATIENT)
Age: 57
End: 2022-09-24

## 2023-01-29 ENCOUNTER — HEALTH MAINTENANCE LETTER (OUTPATIENT)
Age: 58
End: 2023-01-29

## 2023-05-08 ENCOUNTER — HEALTH MAINTENANCE LETTER (OUTPATIENT)
Age: 58
End: 2023-05-08

## 2023-08-21 ENCOUNTER — TELEPHONE (OUTPATIENT)
Dept: TRANSPLANT | Facility: CLINIC | Age: 58
End: 2023-08-21
Payer: COMMERCIAL

## 2023-08-21 NOTE — TELEPHONE ENCOUNTER
Called [facility] dialysis social worker to get an update on Vincent.  She states that he is currently stuck because he has to get dental work done and get his teeth pulled.  However he is not willing to wear dentures so he needs to have a financial plan in place for a different option.  Left her my number and let her know to call me when he gets this done.

## 2023-10-14 ENCOUNTER — HEALTH MAINTENANCE LETTER (OUTPATIENT)
Age: 58
End: 2023-10-14

## 2024-02-25 ENCOUNTER — HEALTH MAINTENANCE LETTER (OUTPATIENT)
Age: 59
End: 2024-02-25

## 2024-07-14 ENCOUNTER — HEALTH MAINTENANCE LETTER (OUTPATIENT)
Age: 59
End: 2024-07-14

## 2024-08-20 ENCOUNTER — TELEPHONE (OUTPATIENT)
Dept: TRANSPLANT | Facility: CLINIC | Age: 59
End: 2024-08-20
Payer: COMMERCIAL

## 2024-08-20 ENCOUNTER — TELEPHONE (OUTPATIENT)
Dept: UROLOGY | Facility: CLINIC | Age: 59
End: 2024-08-20
Payer: COMMERCIAL

## 2024-08-20 DIAGNOSIS — E11.9 DIABETES MELLITUS, TYPE 2 (H): Primary | ICD-10-CM

## 2024-08-20 DIAGNOSIS — Z76.82 ORGAN TRANSPLANT CANDIDATE: ICD-10-CM

## 2024-08-20 DIAGNOSIS — D36.0 BENIGN NEOPLASM OF LYMPH NODES: ICD-10-CM

## 2024-08-20 DIAGNOSIS — N18.6 ESRD (END STAGE RENAL DISEASE) (H): ICD-10-CM

## 2024-08-20 DIAGNOSIS — I10 HYPERTENSION: ICD-10-CM

## 2024-08-20 NOTE — TELEPHONE ENCOUNTER
Patient confirmed scheduled appointment:  Date: Nov 6th   Time: 3pm  Visit type: Cystoscopy   Provider: Joe Tamayo   Location: [facility]  Additional notes: Per staff message:  Visit Type: return-needs Cysto   Provider: Donaldo   Schedule: First available   Appt notes:  request by transplant coordinator

## 2024-08-20 NOTE — TELEPHONE ENCOUNTER
Called Vincent and he answered his phone. Congratulated him on answering. He apologized for his past behaviors of not answering or returning calls. We talked again how important it is to answer his phone especially if he attains active status. He has not been seen in person since 2021 and will need the following items: Wait list, cardiology update, PFT's, LN ultrasound for prominent nodes  that was due May 2022, cysto for hematuria work up, and dental. He is planning on having his sister help him get his dental scheduled. He was to lose 10-20 lbs. He states he has lost weight but does not know how much. Message will be sent to urology for cysto scheduling. Orders for WL etc routed to scheduling.

## 2024-09-19 ENCOUNTER — TELEPHONE (OUTPATIENT)
Dept: TRANSPLANT | Facility: CLINIC | Age: 59
End: 2024-09-19
Payer: COMMERCIAL

## 2024-09-19 NOTE — TELEPHONE ENCOUNTER
Calling patient to let him know he did not show up to any appointments on Monday. Unable to reach him. Left my number for call back at his earliest convenience.

## 2024-09-19 NOTE — TELEPHONE ENCOUNTER
Called to discuss with social work that patient no showed all of his appointments. She stated that he is always compliant with dialysis but his follow through with transplant has been shotty. There was an issue of medication nonadherence with regard to his binders. He does not answer his phone. Will plan to bring to committee to end his evaluation at this time. He can be re referred when he is ready to attend appointments and get his dental work done.

## 2024-09-26 ENCOUNTER — COMMITTEE REVIEW (OUTPATIENT)
Dept: TRANSPLANT | Facility: CLINIC | Age: 59
End: 2024-09-26
Payer: COMMERCIAL

## 2024-09-26 NOTE — LETTER
September 26, 2024    Vincent Leblanc  [address]  [facility] [address]      Dear Mr. Leblanc,   The purpose of this letter is to let you know that on 09/25/2024 the [facility] Multi-Disciplinary Selection Team reviewed the results of your transplant evaluation.  Based on the results of your evaluation and the selection criteria used by our program, the decision was made to not list you on the kidney and pancreas transplant list.  This is because you evaluation has been open since 2020 and you have yet to complete your evaluation. We have had a difficult time communicating with you as you do not answer or return phone calls. You were scheduled to come in to see our providers and you no showed these appointments. If at a later time you remain interested in transplant and there is improvement in the above, you can be re-referred to our transplant center and we would be happy to see you again.   Important things you should know:  If you would like to discuss the decision, or if your medical status changes you may schedule a return visits with your doctor by calling [phone] and asking to speak to your transplant coordinator.  We recommend that you continue to follow up with your primary care doctor in order to manage your health concerns.  We want you to know that our program has physician and surgeon coverage 24 hours a day, 365 days a year. In addition, our transplant surgeons and physicians will not be on call for two or more transplant programs more than 30 miles apart unless the circumstances have been reviewed and approved by the [employer] Organ Sharing (UNOS) Membership and Professional Standards Committee (MPSC). Finally, our primary physician and primary surgeons are not designated as the primary surgeon or primary physician at more than 1 transplant hospital. If this coverage changes or there are substantial program changes, you will be notified in writing by  letter.   Attached is a letter from UNOS that describes the services and information offered to patients by UNOS and the Organ Procurement and Transplantation Network (OPTN).    Thank you for allowing us to participate in your care.  We wish you well.  Sincerely,  Liz Damon RN   Pre-Kidney and Pancreas Transplant Coordinator  Direct line: [phone]      Solid Organ Transplant  [facility]    Enclosures: UNOS Letter  cc: Care Team    The Organ Procurement and Transplantation Network Toll-free patient services line:  [phone]  Your resource for organ transplant information    Staffed 8:30 am - 5:00 pm ET Monday - Friday Leave a message 24/7 to receive a call back    The Organ Procurement and Transplantation Network (OPTN) is the national transplant system. It makes the policies that decide how donated organs are matched to patients waiting for a transplant. The OPTN:    Makes sure donated organs get matched to people on the transplant waiting list  Tells people about the donation and transplant processes  Makes sure that the public knows about the need for more organ and tissue donations    The OPTN has a free patient services line that you can call to:  Get more information about:  Organ donation and organ transplants  Donation and transplant policies  Get an information kit with:  A list of transplant hospitals  Waiting list information  Talk about any questions you may have about your transplant hospital or organ procurement organization. The staff will do their best to help you or point you to others who may help.  Find out how you can volunteer with the OPTN and help shape transplant policy     The patient services line number is: 4-515-854-4241    Patient services line staff CANNOT answer questions about your own medical care, including:  Waiting list status  Test results  Medical records  You will need to call your transplant hospital for  this information.    The following websites have more information about transplantation and donation:    OPTN: https://optn.transplant.hrsa.gov/    For potential living donors and transplant recipients:    Living with transplant: https://www.transplantliving.org/    Living donation process: https://optn.transplant.hrsa.gov/living-donation/    Financial assistance: https://www.livingdonorassistance.org/    Transplantation data: https://www.srtr.org/    Organ donation: https://www.organdonor.gov/    Volunteer with the OPTN: https://optn.transplant.hrsa.gov/get-involved/

## 2024-09-26 NOTE — COMMITTEE REVIEW
Kidney/Pancreas Committee Review Note     Evaluation Date: 1/6/2021  Committee Review Date: 9/25/2024    Organ being evaluated for: Kidney/Pancreas    Transplant Phase: Evaluation  Transplant Status: Active    Transplant Coordinator: Liz Damon  Transplant Surgeon:   Marin Canseco    Referring Physician: Tony Alberto    Primary Diagnosis: Diabetes Mellitus - Type II (Pancreas)  Secondary Diagnosis: ESKD    Committee Review Members:  Cardiology Kaylene Harkins, APRN CNP   Nephrology Jeni Wise, NP, Uri Wright, APRN CNP, Jett Lujan MD   Nutrition Maeve Swain, MERY   Pharmacist Kelsey Fox, [facility]    - Clinical Tasneem Ann, MSW, Cruz Phillips MSW   Transplant CARINA RAY, RN, Soledad Johnson, RN, Teodora Solano, RN, Sharri Gomes, RN, Devi Roque, RN, Jeannie Martini, NP, Liz Stephen, RN, Vanessa Mcnair, RN   Transplant Surgery Marin Canseco MD       Transplant Eligibility: Insulin-dependent diabetes mellitus, Irreversible chronic kidney disease treated w/dialysis or expected need for dialysis    Committee Review Decision: Declined    Relative Contraindications: Other, no show to appointments, does not answer or return calls    Absolute Contraindications:      Committee Chair Marin Canseco MD verbally attested to the committee's decision.    Committee Discussion Details: Discussed with the selection committee the conversation I had with the dialysis social worker over the phone last week. We discussed that the patient has been in evaluation for several years and has not finished. He recently no showed all of his appointments. He does not return calls or answer his phone. For all of these reasons, his evaluation will be ended and he can be re-referred at the end

## 2024-09-30 ENCOUNTER — TELEPHONE (OUTPATIENT)
Dept: TRANSPLANT | Facility: CLINIC | Age: 59
End: 2024-09-30
Payer: COMMERCIAL

## 2024-09-30 NOTE — TELEPHONE ENCOUNTER
[facility] Call Center    Phone Message    May a detailed message be left on voicemail: yes     Reason for Call: Other: Pt calling in and asking to reschedule appointments that were missed on 9/16. ( I did see that orders were canceled) I let Vincent know that I would need to send a msg to his RNCC to confirm all that was needed. Could you please connect with pt or place orders? Thanks!

## 2024-09-30 NOTE — TELEPHONE ENCOUNTER
Called patient SW at dialysis to discuss Vincent again. Let her know that he had been declined, I had left him a VM and sent a letter and he called today to rescheduled his appointments. She will speak with him tomorrow regarding this and I will plan to call after his run.

## 2024-11-01 ENCOUNTER — PRE VISIT (OUTPATIENT)
Dept: UROLOGY | Facility: CLINIC | Age: 59
End: 2024-11-01
Payer: COMMERCIAL

## 2024-11-01 NOTE — TELEPHONE ENCOUNTER
Reason for visit: Cystoscopy         Relevant information:  **Mobility Impaired Walker and wheelchair prn  R leg prosthetic- March 2020**, review non obstructing left kidney stone seen on CT from 11/1/21    Records/imaging/labs/orders: all records available    Pt called: no need for a call    At Rooming: ask symptoms, collect a urine/dip    Prieto Huffman  11/1/2024  1:04 PM

## 2024-11-06 ENCOUNTER — OFFICE VISIT (OUTPATIENT)
Dept: UROLOGY | Facility: CLINIC | Age: 59
End: 2024-11-06
Payer: COMMERCIAL

## 2024-11-06 VITALS
BODY MASS INDEX: 32.9 KG/M2 | OXYGEN SATURATION: 95 % | DIASTOLIC BLOOD PRESSURE: 84 MMHG | HEIGHT: 71 IN | HEART RATE: 70 BPM | WEIGHT: 235 LBS | SYSTOLIC BLOOD PRESSURE: 155 MMHG

## 2024-11-06 DIAGNOSIS — Z01.818 ENCOUNTER FOR PRE-TRANSPLANT EVALUATION FOR KIDNEY AND PANCREAS TRANSPLANT: ICD-10-CM

## 2024-11-06 DIAGNOSIS — R31.29 OTHER MICROSCOPIC HEMATURIA: Primary | ICD-10-CM

## 2024-11-06 PROCEDURE — 52000 CYSTOURETHROSCOPY: CPT | Performed by: STUDENT IN AN ORGANIZED HEALTH CARE EDUCATION/TRAINING PROGRAM

## 2024-11-06 ASSESSMENT — PAIN SCALES - GENERAL: PAINLEVEL_OUTOF10: NO PAIN (0)

## 2024-11-06 NOTE — PROGRESS NOTES
HISTORY OF PRESENT ILLNESS:  Mr. Leblanc is a pleasant 59 year old male who I am seeing today for cystoscopy given his microscopic hematuria discovered back in 2021 in the setting of need for kidney and pancreas transplant.  Was seen in our department virtually on 11/10/2021, at which time it was reviewed with him that he had 26 red blood cells per high-powered field seen on 11/1/2021.  Urine culture returned negative, so recommendation was to have this hematuria looked into.  CT abdomen pelvis without IV contrast from 11/1/2021 did not show any evidence of malignant etiology of his hematuria, though we did at that time have a 5 mm left lower pole renal calcification.    PROCEDURE:    Mr. Leblanc was brought to the cystoscopy suite and placed in the lithotomy position. he was prepped and draped in the standard fashion.  A FLEXIBLE CYSTOSCOPE was inserted through the urethra and into the bladder.  Urethroscopy was normal. The sphincter coapted normally.  The prostate exhibited mild to moderate bilobar hypertrophy. The bladder was entered and inspected.  There were no diverticula, cellules, stones or foreign bodies noted.  Mild trabeculations were noted. The ureteral orifices were identified in their orthotopic positions.  There were no areas of concerning erythema or papillary lesions. Retroflex view demonstrated a normal appearing bladder neck with mild intravesical protrusion of the prostate. The cystoscope was then removed with no evidence of active bleeding.  Mr. Leblanc tolerated the procedure well.     IMPRESSION:   Microscopic hematuria  Being evaluated for kidney and pancreas transplant    It was my pleasure to meet with Vincent to complete his microscopic hematuria workup in the setting of being evaluated for kidney and pancreas transplant.  After completing his cystoscopy I was happy to let him know that I did not see any evidence of malignant or concerning lesions within the bladder which is excellent  news.  However, given that the previous scan he got in 2021 is both that old and did not fully evaluate the kidneys without contrast.  Unfortunately he does his dialysis so early in the morning I do not believe we would be able to coordinate getting a CT urogram followed by dialysis, so instead we will plan to have him get an MR urogram at [facility] in [place] and I will plan to have him call me once he finishes the scan so I can review and make sure that his kidneys are free of any concerning lesions.    Mr. Leblanc expressed understanding and agreement to the above discussion and plan and all of his questions were answered to his satisfaction.     PLAN:   First available MR urogram close to his home for full completion of his microscopic hematuria workup    Signed by:    Joe Tamayo PA-C  [facility]  [address] [place] [address]

## 2024-11-06 NOTE — Clinical Note
Anna Marie Hill!  Just had the pleasure of seeing this patient for a cystoscopy to more fully evaluate his microscopic hematuria.  Happy to say that there is nothing in his bladder that looks concerning.  However, on review of the scan completed in 2021 to evaluate his microscopic hematuria, I do not think the CT abdomen pelvis without IV contrast is enough to rule out any potential lesions in the kidneys.  With this in mind, my recommendation is to get an MR urogram which he would prefer to do in [facility].  I have placed the order and will have my team fax the order there and I have asked him to call me when he gets this done so I can review and fully clear him for transplant.  Let me know if there is anything else you need from me!  Best,  Donaldo

## 2024-11-06 NOTE — Clinical Note
Anna Marie Arceo,  Could you assist me in faxing the order for the MR urogram I just placed to [facility] in [place] for this patient?  Thank you!  Donaldo

## 2024-11-06 NOTE — LETTER
11/6/2024       RE: Vincent Leblanc  [address] [place] [id number]     Dear Colleague,    Thank you for referring your patient, Vincent Leblanc, to the [facility] [place] [facility]. Please see a copy of my visit note below.    HISTORY OF PRESENT ILLNESS:  Mr. Leblanc is a pleasant 59 year old male who I am seeing today for cystoscopy given his microscopic hematuria discovered back in 2021 in the setting of need for kidney and pancreas transplant.  Was seen in our department virtually on 11/10/2021, at which time it was reviewed with him that he had 26 red blood cells per high-powered field seen on 11/1/2021.  Urine culture returned negative, so recommendation was to have this hematuria looked into.  CT abdomen pelvis without IV contrast from 11/1/2021 did not show any evidence of malignant etiology of his hematuria, though we did at that time have a 5 mm left lower pole renal calcification.    PROCEDURE:    Mr. Leblanc was brought to the cystoscopy suite and placed in the lithotomy position. he was prepped and draped in the standard fashion.  A FLEXIBLE CYSTOSCOPE was inserted through the urethra and into the bladder.  Urethroscopy was normal. The sphincter coapted normally.  The prostate exhibited mild to moderate bilobar hypertrophy. The bladder was entered and inspected.  There were no diverticula, cellules, stones or foreign bodies noted.  Mild trabeculations were noted. The ureteral orifices were identified in their orthotopic positions.  There were no areas of concerning erythema or papillary lesions. Retroflex view demonstrated a normal appearing bladder neck with mild intravesical protrusion of the prostate. The cystoscope was then removed with no evidence of active bleeding.  Mr. Leblanc tolerated the procedure well.     IMPRESSION:   Microscopic hematuria  Being evaluated for kidney and pancreas transplant    It was my pleasure  to meet with Vincent to complete his microscopic hematuria workup in the setting of being evaluated for kidney and pancreas transplant.  After completing his cystoscopy I was happy to let him know that I did not see any evidence of malignant or concerning lesions within the bladder which is excellent news.  However, given that the previous scan he got in 2021 is both that old and did not fully evaluate the kidneys without contrast.  Unfortunately he does his dialysis so early in the morning I do not believe we would be able to coordinate getting a CT urogram followed by dialysis, so instead we will plan to have him get an MR urogram at [facility] in [place] and I will plan to have him call me once he finishes the scan so I can review and make sure that his kidneys are free of any concerning lesions.    Mr. Leblanc expressed understanding and agreement to the above discussion and plan and all of his questions were answered to his satisfaction.     PLAN:   First available MR urogram close to his home for full completion of his microscopic hematuria workup    Signed by:    Joe Tamayo PA-C  [facility]  [address] [place] [address]       Again, thank you for allowing me to participate in the care of your patient.      Sincerely,    Joe Tamayo PA-C

## 2024-11-06 NOTE — NURSING NOTE
"Chief Complaint   Patient presents with    Consult     Pt states is here for a cystoscopy       Blood pressure (!) 155/84, pulse 70, height 1.803 m (5' 11\"), weight 106.6 kg (235 lb), SpO2 95%. Body mass index is 32.78 kg/m .    Patient Active Problem List   Diagnosis    ESRD (end stage renal disease) on dialysis (H)    Hypertension    Diabetes mellitus, type 2 (H)    Chronic diarrhea    Fecal incontinence    Former tobacco use    PAD (peripheral artery disease) (H)    Status post coronary angiogram       Allergies   Allergen Reactions    Food Anaphylaxis     Allergy to peaches    Lactose Unknown     Added from facility Allergy list        Current Outpatient Medications   Medication Sig Dispense Refill    acetaZOLAMIDE (DIAMOX SEQUEL) 500 MG 12 hr capsule       amLODIPine (NORVASC) 5 MG tablet Take 5 mg by mouth      aspirin (ASA) 81 MG chewable tablet       atorvastatin (LIPITOR) 40 MG tablet       brimonidine-timolol (COMBIGAN) 0.2-0.5 % ophthalmic solution 1 drop      carvedilol (COREG) 25 MG tablet Take 25 mg by mouth      cholecalciferol 25 MCG (1000 UT) TABS Take 25 mcg by mouth      clopidogrel (PLAVIX) 75 MG tablet       dorzolamide (TRUSOPT) 2 % ophthalmic solution 1 drop      dorzolamide (TRUSOPT) 2 % ophthalmic solution  (Patient not taking: No sig reported)      furosemide (LASIX) 80 MG tablet Take 80 mg by mouth      hydrALAZINE (APRESOLINE) 25 MG tablet Take 25 mg by mouth      insulin aspart (NOVOLOG PEN) 100 UNIT/ML pen Use carb ratio of 1:8 (breakfast), 1:10 (lunch, supper and snacks) and SF 30 - TDD 60 units      insulin glargine (LANTUS PEN) 100 UNIT/ML pen Inject 24 Units Subcutaneous      insulin pen needle (32G X 4 MM) 32G X 4 MM miscellaneous       iron sucrose (VENOFER) 20 MG/ML injection 200 mg by Intravenous Push route      latanoprost (XALATAN) 0.005 % ophthalmic solution       loperamide (IMODIUM) 2 MG capsule Take 6 mg by mouth      multivitamin RENAL (RENAVITE RX/NEPHROVITE) 1 MG tablet " Take 1 tablet by mouth      neomycin-polymixin-dexamethasone (MAXITROL) ophthalmic ointment Place  into the left eye at bedtime. (Patient not taking: No sig reported)      ofloxacin (OCUFLOX) 0.3 % ophthalmic solution       prednisoLONE acetate (PRED FORTE) 1 % ophthalmic suspension 1 drop      sevelamer HCl (RENAGEL) 800 MG tablet Take 800 mg by mouth      tamsulosin (FLOMAX) 0.4 MG capsule Take 0.4 mg by mouth         Social History     Tobacco Use    Smoking status: Former     Current packs/day: 0.00     Average packs/day: 0.5 packs/day for 15.0 years (7.5 ttl pk-yrs)     Types: Cigarettes     Start date:      Quit date:      Years since quitting: 10.8    Smokeless tobacco: Never   Substance Use Topics    Alcohol use: Yes    Drug use: Never       What to expect after the procedure reviewed with patient: yes    Prieto Huffman  2024  2:59 PM     Invasive Procedure Safety Checklist:    Procedure: cystoscopy    Action: Complete sections and checkboxes as appropriate.    Pre-procedure:  1. Patient ID Verified with 2 identifiers (Cammy and  or MRN) : YES    2. Procedure and site verified with patient/designee (when able) : YES    3. Accurate consent documentation in medical record : YES    4. H&P (or appropriate assessment) documented in medical record : YES  H&P must be up to 30 days prior to procedure an updated within 24 hours of                 Procedure as applicable.     5. Relevant diagnostic and radiology test results appropriately labeled and displayed as applicable : YES    6. Blood products, implants, devices, and/or special equipment available for the procedure as applicable : YES    7. Procedure site(s) marked with provider initials [Exclusions: None] : NO    8. Marking not required. Reason : Yes  Procedure does not require site marking    Time Out:     Time-Out performed immediately prior to starting procedure, including verbal and active participation of all team members addressing:  YES    1. Correct patient identity.  2. Confirmed that the correct side and site are marked.  3. An accurate procedure to be done.  4. Agreement on the procedure to be done.  5. Correct patient position.  6. Relevant images and results are properly labeled and appropriately displayed.  7. The need to administer antibiotics or fluids for irrigation purposes during the procedure as applicable.  8. Safety precautions based on patient history or medication use.    During Procedure: Verification of correct person, site, and procedure occurs any time the responsibility for care of the patient is transferred to another member of the care team.    No medications administered during this procedure.    Prieto Huffman  November 6, 2024

## 2024-11-10 ENCOUNTER — TELEPHONE (OUTPATIENT)
Dept: SURGERY | Facility: CLINIC | Age: 59
End: 2024-11-10

## 2024-11-10 NOTE — CONFIDENTIAL NOTE
"Telephone Encounter  Author: Justine Rocha MD, MD    Date: 12:52 PM; 11/10/2024  Patient Name: Vincent Leblanc  MRN: [id number]    Paged by  that Vincent Leblanc called requesting to speak with urology on call.  He had office cystoscopy 4 days ago and since then, states that he has had difficulty voiding and has a \"black stream\" with clots. Says that he feels he has difficulty emptying his bladder, but no abdominal pain currently. He also reports no BMs since procedure and attributes it to the cystoscopy (although I educated him that this is unlikely).     I discussed with the patient that a phone conversation does not replace a physical exam. I advised that it sounds like he has significant hematuria from the cystoscopy and disruption of his prostate which can happen. Advised that based on the color, clots, and difficulty voiding, he should be seen by a provider at an ED. He lives near [place] so I advised that it would be reasonable to be seen by an MD closer to home if he preferred.     Justine Rocha MD  Urology Resident, PGY2      --    12:52 PM; 11/10/2024      "

## 2024-11-20 ENCOUNTER — COMMITTEE REVIEW (OUTPATIENT)
Dept: TRANSPLANT | Facility: CLINIC | Age: 59
End: 2024-11-20
Payer: COMMERCIAL

## 2024-11-20 NOTE — COMMITTEE REVIEW
Kidney/Pancreas Committee Review Note     Evaluation Date: 1/6/2021  Committee Review Date: 9/25/2024    Organ being evaluated for: Kidney/Pancreas    Transplant Phase: Evaluation  Transplant Status: Declined    Transplant Coordinator: Liz Damon  Transplant Surgeon:   Marin Canseco    Referring Physician: Tony Alberto    Primary Diagnosis: Diabetes Mellitus - Type II (Pancreas)  Secondary Diagnosis:     Committee Review Members:  Cardiology Kaylene Harkins, APRN CNP   Nephrology Jeni Wise, NP, Uri Wright, APRN CNP, Jett Lujan MD   Nutrition Maeve Swain, MERY   Pharmacist Kelsey Fox, [facility]    - Clinical Tasneem Ann, MSW, Cruz Phillips MSW   Transplant CARINA RAY, RN, Soledad Johnson, ELISA, Teodora Solano, RN, Sharri Gomes, RN, Devi Roque, RN, Jeannie Martini, NP, Liz Stephen, RN, Vanessa Mcnair, RN   Transplant Surgery Marin Canseco MD       Transplant Eligibility: Insulin-dependent diabetes mellitus, Irreversible chronic kidney disease treated w/dialysis or expected need for dialysis    Committee Review Decision: Declined    Relative Contraindications: Other, no show to appointments, does not answer or return calls    Absolute Contraindications:      Committee Chair Marin Casneco MD verbally attested to the committee's decision.    Committee Discussion Details: Discussed with the committee that the patient would like to continue with his eval despite being declined the last time he was discussed.  Committee confirmed he will need phone visit with social work to discuss barriers to coming to appointments and answering his phone.  If this can be worked out and there is change for improved adherence, he could come back and be considered for eval again. Otherwise, will need to go elsewhere for transplant.  Will call patient and dialysis center to discuss.

## 2024-11-21 ENCOUNTER — TELEPHONE (OUTPATIENT)
Dept: TRANSPLANT | Facility: CLINIC | Age: 59
End: 2024-11-21
Payer: COMMERCIAL

## 2024-11-21 NOTE — TELEPHONE ENCOUNTER
Called patient at this time to discuss the recommendations from the team at Selection Committee. Unable to reach him and his VM is full.     Call placed to dialysis center to discuss that patient will need to have a social work visit prior to being considered again for transplant here. This is in order to identify any barriers that are present hindering patient from attending appointments and/or answering his phone. RN noted that patient is hospitalized at this time which is why he cannot answer his phone.     Left my direct line for social work and patient to call back when he is discharged and back at home HD clinic.

## 2024-12-01 ENCOUNTER — HEALTH MAINTENANCE LETTER (OUTPATIENT)
Age: 59
End: 2024-12-01

## 2024-12-03 ENCOUNTER — TELEPHONE (OUTPATIENT)
Dept: TRANSPLANT | Facility: CLINIC | Age: 59
End: 2024-12-03
Payer: COMMERCIAL

## 2024-12-04 NOTE — TELEPHONE ENCOUNTER
Returning call to patient sister as there is a consent to communicate on file.  Unable to reach her. Unable to leave a message. Will attempt call back at a later date.

## 2025-03-15 ENCOUNTER — HEALTH MAINTENANCE LETTER (OUTPATIENT)
Age: 60
End: 2025-03-15

## (undated) DEVICE — SHTH INTRO 0.021IN ID 6FR DIA

## (undated) DEVICE — MANIFOLD KIT ANGIO AUTOMATED 014613

## (undated) DEVICE — FASTENER CATH BALLOON CLAMPX2 STATLOCK 0684-00-493

## (undated) DEVICE — PACK HEART LEFT CUSTOM

## (undated) DEVICE — TUBING PRESSURE 30"

## (undated) DEVICE — SLEEVE TR BAND RADIAL COMPRESSION DEVICE 29CM XX-RF06L

## (undated) DEVICE — KIT HAND CONTROL ACIST 016795

## (undated) DEVICE — Device

## (undated) RX ORDER — NITROGLYCERIN 5 MG/ML
VIAL (ML) INTRAVENOUS
Status: DISPENSED
Start: 2022-09-09

## (undated) RX ORDER — FENTANYL CITRATE 50 UG/ML
INJECTION, SOLUTION INTRAMUSCULAR; INTRAVENOUS
Status: DISPENSED
Start: 2022-09-09

## (undated) RX ORDER — HEPARIN SODIUM 1000 [USP'U]/ML
INJECTION, SOLUTION INTRAVENOUS; SUBCUTANEOUS
Status: DISPENSED
Start: 2022-09-09

## (undated) RX ORDER — NICARDIPINE HCL-0.9% SOD CHLOR 1 MG/10 ML
SYRINGE (ML) INTRAVENOUS
Status: DISPENSED
Start: 2022-09-09